# Patient Record
Sex: FEMALE | Race: BLACK OR AFRICAN AMERICAN | Employment: OTHER | URBAN - METROPOLITAN AREA
[De-identification: names, ages, dates, MRNs, and addresses within clinical notes are randomized per-mention and may not be internally consistent; named-entity substitution may affect disease eponyms.]

---

## 2017-02-20 ENCOUNTER — OFFICE VISIT (OUTPATIENT)
Dept: INTERNAL MEDICINE CLINIC | Age: 67
End: 2017-02-20

## 2017-02-20 ENCOUNTER — HOSPITAL ENCOUNTER (OUTPATIENT)
Dept: LAB | Age: 67
Discharge: HOME | End: 2017-02-20
Payer: MEDICARE

## 2017-02-20 VITALS
TEMPERATURE: 99.2 F | DIASTOLIC BLOOD PRESSURE: 86 MMHG | BODY MASS INDEX: 29.09 KG/M2 | WEIGHT: 181 LBS | RESPIRATION RATE: 16 BRPM | HEIGHT: 66 IN | SYSTOLIC BLOOD PRESSURE: 132 MMHG | OXYGEN SATURATION: 98 % | HEART RATE: 62 BPM

## 2017-02-20 DIAGNOSIS — E78.2 MIXED HYPERLIPIDEMIA: ICD-10-CM

## 2017-02-20 DIAGNOSIS — Z13.39 SCREENING FOR ALCOHOLISM: ICD-10-CM

## 2017-02-20 DIAGNOSIS — I10 BENIGN HYPERTENSION: ICD-10-CM

## 2017-02-20 DIAGNOSIS — Z00.00 ROUTINE GENERAL MEDICAL EXAMINATION AT A HEALTH CARE FACILITY: ICD-10-CM

## 2017-02-20 DIAGNOSIS — Z23 ENCOUNTER FOR IMMUNIZATION: ICD-10-CM

## 2017-02-20 DIAGNOSIS — Z00.00 ENCOUNTER FOR MEDICARE ANNUAL WELLNESS EXAM: Primary | ICD-10-CM

## 2017-02-20 DIAGNOSIS — Z79.899 ENCOUNTER FOR LONG-TERM (CURRENT) DRUG USE: ICD-10-CM

## 2017-02-20 DIAGNOSIS — E11.9 CONTROLLED TYPE 2 DIABETES MELLITUS WITHOUT COMPLICATION, WITHOUT LONG-TERM CURRENT USE OF INSULIN (HCC): ICD-10-CM

## 2017-02-20 PROBLEM — Z78.9 ADVANCE DIRECTIVE IN CHART: Status: ACTIVE | Noted: 2017-02-20

## 2017-02-20 PROBLEM — Z78.9 ADVANCE DIRECTIVE IN CHART: Chronic | Status: ACTIVE | Noted: 2017-02-20

## 2017-02-20 PROCEDURE — 80053 COMPREHEN METABOLIC PANEL: CPT

## 2017-02-20 PROCEDURE — 80061 LIPID PANEL: CPT

## 2017-02-20 PROCEDURE — 82043 UR ALBUMIN QUANTITATIVE: CPT

## 2017-02-20 PROCEDURE — 36415 COLL VENOUS BLD VENIPUNCTURE: CPT

## 2017-02-20 PROCEDURE — 83036 HEMOGLOBIN GLYCOSYLATED A1C: CPT

## 2017-02-20 NOTE — MR AVS SNAPSHOT
Visit Information Date & Time Provider Department Dept. Phone Encounter #  
 2/20/2017 10:40 AM MD [facility] [phone] [id number] Follow-up Instructions Return in about 4 months (around 6/20/2017) for diabetes type 2, hypertension, hyperlipidemia. Your Appointments 2/20/2017 10:40 AM  
ROUTINE CARE with MD [facility] ([facility]) Appt Note: 4m fu for hypertension, diabetes type [address], [address] [place] [address]  
[address], [place] [address] [phone] Upcoming Health Maintenance Date Due  
 MEDICARE YEARLY EXAM 9/28/2016 HEMOGLOBIN A1C Q6M 4/19/2017 EYE EXAM RETINAL OR DILATED Q1 4/29/2017 MICROALBUMIN Q1 10/19/2017 LIPID PANEL Q1 10/19/2017 FOOT EXAM Q1 2/20/2018 GLAUCOMA SCREENING Q2Y 4/29/2018 BREAST CANCER SCRN MAMMOGRAM 9/6/2018 COLONOSCOPY 2/18/2023 DTaP/Tdap/Td series (2 - Td) 10/1/2025 Allergies as of 2/20/2017  Review Complete On: 2/20/2017 By: Aixa Argueta MD  
  
 Severity Noted Reaction Type Reactions Cocoa  09/28/2015    Swelling Swelling of the tongue Peanut  05/13/2015    Swelling Tongue swelling Current Immunizations  Reviewed on 2/20/2017 Name Date Influenza High Dose Vaccine PF 10/19/2016 Influenza Vaccine 9/16/2013 Influenza Vaccine Whole 10/1/2012 Pneumococcal Conjugate (PCV-13) 6/27/2016 Pneumococcal Polysaccharide (PPSV-23)  Incomplete Tdap 10/1/2015 Reviewed by Aixa Argueta MD on 2/20/2017 at 10:31 AM  
 Reviewed by Aixa Argueta MD on 2/20/2017 at 10:31 AM  
You Were Diagnosed With   
  
 Codes Comments Encounter for Medicare annual wellness exam    -  Primary ICD-10-CM: Z00.00 ICD-9-CM: V70.0 Controlled type 2 diabetes mellitus without complication, without long-term current use of insulin ([facility]     ICD-10-CM: E11.9 ICD-9-CM: 250.00 Benign hypertension     ICD-10-CM: I10 
ICD-9-CM: 401.1 Mixed hyperlipidemia     ICD-10-CM: E78.2 ICD-9-CM: 272.2 Encounter for long-term (current) drug use     ICD-10-CM: Z79.899 ICD-9-CM: V58.69 Routine general medical examination at a health care facility     ICD-10-CM: Z00.00 ICD-9-CM: V70.0 Screening for alcoholism     ICD-10-CM: Z13.89 ICD-9-CM: V79.1 Encounter for immunization     ICD-10-CM: N47 ICD-9-CM: V03.89 Vitals BP Pulse Temp Resp Height(growth percentile) Weight(growth percentile) 132/86 (BP 1 Location: Left arm, BP Patient Position: Sitting) 62 99.2 °F (37.3 °C) (Oral) 16 5' 5.5\" (1.664 m) 181 lb (82.1 kg) SpO2 BMI OB Status Smoking Status 98% 29.66 kg/m2 Postmenopausal Never Smoker Vitals History BMI and BSA Data Body Mass Index Body Surface Area  
 29.66 kg/m 2 1.95 m 2 Preferred Pharmacy Pharmacy Name Phone [id number] Melania Howard, [address] [place] [address] [phone] Your Updated Medication List  
  
   
This list is accurate as of: 2/20/17 10:36 AM.  Always use your most recent med list.  
  
  
  
  
 FISH OIL 1,000 mg Cap Generic drug:  omega-3 fatty acids-vitamin e Take 4 Caps by mouth daily. losartan-hydroCHLOROthiazide 100-12.5 mg per tablet Commonly known as:  HYZAAR Take 1 Tab by mouth daily. VITAMIN B-12 PO Take  by mouth. VITAMIN D3 PO Take 2,000 Units by mouth daily. We Performed the Following ADMIN PNEUMOCOCCAL VACCINE [ HCPCS] HEMOGLOBIN A1C WITH EAG [71097 CPT(R)] LIPID PANEL [86828 CPT(R)] METABOLIC PANEL, COMPREHENSIVE [21231 CPT(R)] MICROALBUMIN, UR, RAND W/ MICROALBUMIN/CREA RATIO [id number] CPT(R)] PNEUMOCOCCAL POLYSACCHARIDE VACCINE, 23-VALENT, ADULT OR IMMUNOSUPPRESSED PT DOSE, [34185 CPT(R)] Follow-up Instructions Return in about 4 months (around 6/20/2017) for diabetes type 2, hypertension, hyperlipidemia. To-Do List   
 09/07/2017 1:45 PM  
  Appointment with [facility] VALERY 1 at [address] [place] [address] ([phone]) Shower or bathe using soap and water. Do not use deodorant, powder, perfumes, or lotion the day of your exam.  If your prior mammograms were not performed at [facility] 6 please bring films with you or forward prior images 2 days before your procedure. Check in at registration 15min before your appointment time unless you were instructed to do otherwise. A script is not necessary, but if you have one, please bring it on the day of the mammogram or have it faxed to the department. [facility] [phone] [facility]  [phone] [address]  [phone] [facility] [phone] [facility] 7437 [facility] [phone] Patient Instructions Vaccine Information Statement Pneumococcal Polysaccharide Vaccine: What You Need to Know Many Vaccine Information Statements are available in Ukrainian and other languages. See www.immunize.org/vis. Hojas de información Sobre Vacunas están disponibles en español y en muchos otros idiomas. Visite WorthScale.si. 1. Why get vaccinated? Vaccination can protect older adults (and some children and younger adults) from pneumococcal disease. Pneumococcal disease is caused by bacteria that can spread from person to person through close contact. It can cause ear infections, and it can also lead to more serious infections of the: 
 Lungs (pneumonia),  Blood (bacteremia), and 
 Covering of the brain and spinal cord (meningitis). Meningitis can cause deafness and brain damage, and it can be fatal.   
 
Anyone can get pneumococcal disease, but children under 3years of age, people with certain medical conditions, adults over 72years of age, and cigarette smokers are at the highest risk. About 18,000 older adults die each year from pneumococcal disease in the [place]. Treatment of pneumococcal infections with penicillin and other drugs used to be more effective. But some strains of the disease have become resistant to these drugs. This makes prevention of the disease, through vaccination, even more important. 2. Pneumococcal polysaccharide vaccine (PPSV23) Pneumococcal polysaccharide vaccine (PPSV23) protects against 23 types of pneumococcal bacteria. It will not prevent all pneumococcal disease. PPSV23 is recommended for:  All adults 72years of age and older,  Anyone 2 through 59years of age with certain long-term health problems, 
 Anyone 2 through 59years of age with a weakened immune system, 
 Adults 23 through 59years of age who smoke cigarettes or have asthma. Most people need only one dose of PPSV. A second dose is recommended for certain high-risk groups. People 72 and older should get a dose even if they have gotten one or more doses of the vaccine before they turned 65. Your healthcare provider can give you more information about these recommendations. Most healthy adults develop protection within 2 to 3 weeks of getting the shot. 3. Some people should not get this vaccine  Anyone who has had a life-threatening allergic reaction to PPSV should not get another dose.  Anyone who has a severe allergy to any component of PPSV should not receive it. Tell your provider if you have any severe allergies.  Anyone who is moderately or severely ill when the shot is scheduled may be asked to wait until they recover before getting the vaccine. Someone with a mild illness can usually be vaccinated.  Children less than 3years of age should not receive this vaccine.  There is no evidence that PPSV is harmful to either a pregnant woman or to her fetus.  However, as a precaution, women who need the vaccine should be vaccinated before becoming pregnant, if possible. 4. Risks of a vaccine reaction With any medicine, including vaccines, there is a chance of side effects. These are usually mild and go away on their own, but serious reactions are also possible. About half of people who get PPSV have mild side effects, such as redness or pain where the shot is given, which go away within about two days. Less than 1 out of 100 people develop a fever, muscle aches, or more severe local reactions. Problems that could happen after any vaccine:  People sometimes faint after a medical procedure, including vaccination. Sitting or lying down for about 15 minutes can help prevent fainting, and injuries caused by a fall. Tell your doctor if you feel dizzy, or have vision changes or ringing in the ears.  Some people get severe pain in the shoulder and have difficulty moving the arm where a shot was given. This happens very rarely.  Any medication can cause a severe allergic reaction. Such reactions from a vaccine are very rare, estimated at about 1 in a million doses, and would happen within a few minutes to a few hours after the vaccination. As with any medicine, there is a very remote chance of a vaccine causing a serious injury or death. The safety of vaccines is always being monitored. For more information, visit: www.cdc.gov/vaccinesafety/  
 
5. What if there is a serious reaction? What should I look for? Look for anything that concerns you, such as signs of a severe allergic reaction, very high fever, or unusual behavior. Signs of a severe allergic reaction can include hives, swelling of the face and throat, difficulty breathing, a fast heartbeat, dizziness, and weakness. These would usually start a few minutes to a few hours after the vaccination. What should I do?  
 
If you think it is a severe allergic reaction or other emergency that cant wait, call 9-1-1 or get to the nearest hospital. Otherwise, call your doctor. Afterward, the reaction should be reported to the Vaccine Adverse Event Reporting System (VAERS). Your doctor might file this report, or you can do it yourself through the VAERS web site at www.vaers. [facility].gov, or by calling 5-498.549.9310. VAERS does not give medical advice. 6. How can I learn more?  Ask your doctor. He or she can give you the vaccine package insert or suggest other sources of information.  Call your local or state health department.  Contact the Centers for Disease Control and Prevention (CDC): 
- Call 7-872.780.3799 (1-800-CDC-INFO) or 
- Visit CDCs website at www.cdc.gov/vaccines Vaccine Information Statement PPSV  
04/24/2015 Department of [facility] and [employer] Centers for Disease Control and Prevention Office Use Only Introducing [facility] SERVICES! Megan Sherman introduces [employer] patient portal. Now you can access parts of your medical record, email your doctor's office, and request medication refills online. 1. In your internet browser, go to [email or web address] 2. Click on the First Time User? Click Here link in the Sign In box. You will see the New Member Sign Up page. 3. Enter your [employer] Access Code exactly as it appears below. You will not need to use this code after youve completed the sign-up process. If you do not sign up before the expiration date, you must request a new code. · [employer] Access Code: [email or web address] Expires: 5/21/2017 10:26 AM 
 
4. Enter the last four digits of your Social Security Number (xxxx) and Date of Birth (mm/dd/yyyy) as indicated and click Submit. You will be taken to the next sign-up page. 5. Create a Scary Mommyt ID. This will be your [employer] login ID and cannot be changed, so think of one that is secure and easy to remember. 6. Create a Scary Mommyt password. You can change your password at any time. 7. Enter your Password Reset Question and Answer. This can be used at a later time if you forget your password. 8. Enter your e-mail address. You will receive e-mail notification when new information is available in [address] 9. Click Sign Up. You can now view and download portions of your medical record. 10. Click the Download Summary menu link to download a portable copy of your medical information. If you have questions, please visit the Frequently Asked Questions section of the [employer] website. Remember, [employer] is NOT to be used for urgent needs. For medical emergencies, dial 911. Now available from your iPhone and Android! Please provide this summary of care documentation to your next provider. Your primary care clinician is listed as Danica Montalvo. If you have any questions after today's visit, please call [phone].

## 2017-02-20 NOTE — PROGRESS NOTES
Subjective:      Precious Peralta is a 78 y.o. female who presents today for follow up of her hypertension, diabetes mellitus type 2 - diet controlled and hyperlipidemia. Not checking her glucose at home. She is maintaining a diabetes mellitus diet. Exercise - light weights daily. 15 minutes. She denies polyuria, polydipsia, nocturia, nausea/vomiting, bowel changes, chest pain, syncope, dyspnea or lightheadedness. Patient Active Problem List   Diagnosis Code    Essential hypertension I10    History of screening mammography Z92.89    Pap smear for cervical cancer screening Z12.4    Screen for colon cancer Z12.11    [facility], [facility] ([facility] E11.9    History of bone density study Z92.89    Hyperlipidemia E78.5    Diabetic eye exam ([facility].) E11.9, Z01.00     Current Outpatient Prescriptions   Medication Sig Dispense Refill    losartan-hydroCHLOROthiazide (HYZAAR) 100-12.5 mg per tablet Take 1 Tab by mouth daily. 90 Tab 0    omega-3 fatty acids-vitamin e (FISH OIL) 1,000 mg cap Take 4 Caps by mouth daily.  CHOLECALCIFEROL, VITAMIN D3, (VITAMIN D3 PO) Take 2,000 Units by mouth daily.  CYANOCOBALAMIN, VITAMIN B-12, (VITAMIN B-12 PO) Take  by mouth. Review of Systems    Pertinent items are noted in HPI. Objective:      Wt Readings from Last 3 Encounters:   02/20/17 181 lb (82.1 kg)   10/19/16 180 lb (81.6 kg)   06/15/16 176 lb (79.8 kg)     Temp Readings from Last 3 Encounters:   02/20/17 99.2 °F (37.3 °C) (Oral)   10/19/16 97.2 °F (36.2 °C) (Oral)   06/15/16 97.4 °F (36.3 °C) (Oral)     BP Readings from Last 3 Encounters:   02/20/17 132/86   10/19/16 140/70   06/15/16 140/82     Pulse Readings from Last 3 Encounters:   02/20/17 62   10/19/16 60   06/15/16 60        Visit Vitals    /86 (BP 1 Location: Left arm, BP Patient Position: Sitting)    Pulse 62    Temp 99.2 °F (37.3 °C) (Oral)    Resp 16    Ht 5' 5.5\" (1.664 m)    Wt 181 lb (82.1 kg)    SpO2 98%    BMI 29.66 kg/m2     General appearance: alert, cooperative, no distress, appears stated age  Head: Normocephalic, without obvious abnormality, atraumatic  Neck: supple, symmetrical, trachea midline, no adenopathy, no carotid bruit and no JVD  Lungs: clear to auscultation bilaterally  Abdomen: soft, non-tender. Bowel sounds normal. No masses,  no organomegaly    Diabetic foot exam:     Left: Reflexes 1+     Vibratory sensation normal    Proprioception normal   Sharp/dull discrimination normal    Filament test normal sensation with micro filament   Pulse DP: 2+ (normal)   Pulse PT: 2+ (normal)   Deformities: some callus formation  Right: Reflexes 1+   Vibratory sensation normal   Proprioception normal   Sharp/dull discrimination normal   Filament test normal sensation with micro filament   Pulse DP: 2+ (normal)   Pulse PT: 2+ (normal)   Deformities: Mild - callus formations     Assessment/Plan:     1. Controlled type 2 diabetes mellitus without complication, without long-term current use of insulin ([address]- diet controlled. -continue diabetes mellitus diet.  -encouraged increase in cardio exercise. - METABOLIC PANEL, COMPREHENSIVE  - HEMOGLOBIN A1C WITH EAG  - MICROALBUMIN, UR, RAND W/ MICROALBUMIN/CREA RATIO    2. Benign hypertension  -I evaluated and recommended to continue current doses of medications. 3. Mixed hyperlipidemia  -diet controlled at this time.     - LIPID PANEL    4. Encounter for long-term (current) drug use      5. Encounter for immunization  -patient received her pneumovax today without any complications.     - PNEUMOCOCCAL POLYSACCHARIDE VACCINE, 23-VALENT, ADULT OR IMMUNOSUPPRESSED PT DOSE,  - ADMIN PNEUMOCOCCAL VACCINE  (MEDICARE ONLY)       Follow-up Disposition:     Follow up 4 months      Return if symptoms worsen or fail to improve.    Advised patient to call back or return to office if symptoms worsen/change/persist.     Discussed expected course/resolution/complications of diagnosis in detail with patient. Medication risks/benefits/costs/interactions/alternatives discussed with patient. Patient was given an after visit summary which includes diagnoses, current medications, & vitals. Patient expressed understanding with the diagnosis and plan.

## 2017-02-20 NOTE — PATIENT INSTRUCTIONS
Vaccine Information Statement    Pneumococcal Polysaccharide Vaccine: What You Need to Know    Many Vaccine Information Statements are available in Faroese and other languages. See www.immunize.org/vis. Hojas de información Sobre Vacunas están disponibles en español y en muchos otros idiomas. Visite Kailey.si. 1. Why get vaccinated? Vaccination can protect older adults (and some children and younger adults) from pneumococcal disease. Pneumococcal disease is caused by bacteria that can spread from person to person through close contact. It can cause ear infections, and it can also lead to more serious infections of the:   Lungs (pneumonia),   Blood (bacteremia), and   Covering of the brain and spinal cord (meningitis). Meningitis can cause deafness and brain damage, and it can be fatal.      Anyone can get pneumococcal disease, but children under 3years of age, people with certain medical conditions, adults over 72years of age, and cigarette smokers are at the highest risk. About 18,000 older adults die each year from pneumococcal disease in the [place]. Treatment of pneumococcal infections with penicillin and other drugs used to be more effective. But some strains of the disease have become resistant to these drugs. This makes prevention of the disease, through vaccination, even more important. 2. Pneumococcal polysaccharide vaccine (PPSV23)    Pneumococcal polysaccharide vaccine (PPSV23) protects against 23 types of pneumococcal bacteria. It will not prevent all pneumococcal disease. PPSV23 is recommended for:   All adults 72years of age and older,   Anyone 2 through 59years of age with certain long-term health problems,   Anyone 2 through 59years of age with a weakened immune system,   Adults 23 through 59years of age who smoke cigarettes or have asthma. Most people need only one dose of PPSV.   A second dose is recommended for certain high-risk groups. People 72 and older should get a dose even if they have gotten one or more doses of the vaccine before they turned 65. Your healthcare provider can give you more information about these recommendations. Most healthy adults develop protection within 2 to 3 weeks of getting the shot. 3. Some people should not get this vaccine     Anyone who has had a life-threatening allergic reaction to PPSV should not get another dose.  Anyone who has a severe allergy to any component of PPSV should not receive it. Tell your provider if you have any severe allergies.  Anyone who is moderately or severely ill when the shot is scheduled may be asked to wait until they recover before getting the vaccine. Someone with a mild illness can usually be vaccinated.  Children less than 3years of age should not receive this vaccine.  There is no evidence that PPSV is harmful to either a pregnant woman or to her fetus. However, as a precaution, women who need the vaccine should be vaccinated before becoming pregnant, if possible. 4. Risks of a vaccine reaction    With any medicine, including vaccines, there is a chance of side effects. These are usually mild and go away on their own, but serious reactions are also possible. About half of people who get PPSV have mild side effects, such as redness or pain where the shot is given, which go away within about two days. Less than 1 out of 100 people develop a fever, muscle aches, or more severe local reactions. Problems that could happen after any vaccine:     People sometimes faint after a medical procedure, including vaccination. Sitting or lying down for about 15 minutes can help prevent fainting, and injuries caused by a fall. Tell your doctor if you feel dizzy, or have vision changes or ringing in the ears.  Some people get severe pain in the shoulder and have difficulty moving the arm where a shot was given.  This happens very rarely.  Any medication can cause a severe allergic reaction. Such reactions from a vaccine are very rare, estimated at about 1 in a million doses, and would happen within a few minutes to a few hours after the vaccination. As with any medicine, there is a very remote chance of a vaccine causing a serious injury or death. The safety of vaccines is always being monitored. For more information, visit: www.cdc.gov/vaccinesafety/     5. What if there is a serious reaction? What should I look for? Look for anything that concerns you, such as signs of a severe allergic reaction, very high fever, or unusual behavior. Signs of a severe allergic reaction can include hives, swelling of the face and throat, difficulty breathing, a fast heartbeat, dizziness, and weakness. These would usually start a few minutes to a few hours after the vaccination. What should I do? If you think it is a severe allergic reaction or other emergency that cant wait, call 9-1-1 or get to the nearest hospital. Otherwise, call your doctor. Afterward, the reaction should be reported to the Vaccine Adverse Event Reporting System (VAERS). Your doctor might file this report, or you can do it yourself through the VAERS web site at www.vaers. [facility].gov, or by calling 7-125.174.9032. [facility] does not give medical advice. 6. How can I learn more?  Ask your doctor. He or she can give you the vaccine package insert or suggest other sources of information.  Call your local or state health department.    Contact the Centers for Disease Control and Prevention (CDC):  - Call 9-469.361.1607 (1-800-CDC-INFO) or  - Visit CDCs website at [place] [address] 04/24/2015    [facility] and [facility] for Disease Control and Prevention    Office Use Only

## 2017-02-20 NOTE — PROGRESS NOTES
HPI    ROS    Physical Exam    This is a Subsequent Medicare Annual Wellness Visit providing Personalized Prevention Plan Services (PPPS) (Performed 12 months after initial AWV and PPPS )    I have reviewed the patient's medical history in detail and updated the computerized patient record. History     Ashley Mayes is a 78 y.o. female who presents today for her medicare wellness exam.     Screening mammogram was completed 9/6/16  Gyn exam with Dr. Cosmo Coto was done 3/19/15 and has follow up 3/1/17. DXA done at [facility] 1-/2015. Normal. Need follow up end of this year  Screening colonoscopy done 1/21/13. Asked to follow up in  5 years. Eye Exam -with Dr. Debbie Crump 4/29/16  Advanced Directive - in chart  Immunizations - need pneumovax today    Past Medical History   Diagnosis Date    Pap smear for cervical cancer screening 3/22/2012      History reviewed. No pertinent past surgical history. Current Outpatient Prescriptions   Medication Sig Dispense Refill    losartan-hydroCHLOROthiazide (HYZAAR) 100-12.5 mg per tablet Take 1 Tab by mouth daily. 90 Tab 0    omega-3 fatty acids-vitamin e (FISH OIL) 1,000 mg cap Take 4 Caps by mouth daily.  CHOLECALCIFEROL, VITAMIN D3, (VITAMIN D3 PO) Take 2,000 Units by mouth daily.  CYANOCOBALAMIN, VITAMIN B-12, (VITAMIN B-12 PO) Take  by mouth.        Allergies   Allergen Reactions    Cocoa Swelling     Swelling of the tongue    Peanut Swelling     Tongue swelling     Family History   Problem Relation Age of Onset    Breast Cancer Mother      29's     Social History   Substance Use Topics    Smoking status: Never Smoker    Smokeless tobacco: Never Used    Alcohol use No     Patient Active Problem List   Diagnosis Code    Essential hypertension I10    History of screening mammography Z92.89    Pap smear for cervical cancer screening Z12.4    Screen for colon cancer Z12.11    Diabetes mellitus, type 2 ([address] E11.9    History of bone density study Z92.89  Hyperlipidemia E78.5    Diabetic eye exam ([facility] E11.9, Z01.00       Depression Risk Factor Screening:      PHQ 2 / 9, over the last two weeks 10/19/2016   Little interest or pleasure in doing things Not at all   Feeling down, depressed or hopeless Not at all   Total Score PHQ 2 0     Alcohol Risk Factor Screening: On any occasion during the past 3 months, have you had more than 3 drinks containing alcohol? No    Do you average more than 7 drinks per week? No      Functional Ability and Level of Safety:     Hearing Loss   borderline normal-to-mild    Activities of Daily Living   Self-care. Requires assistance with: no ADLs    Fall Risk     Fall Risk Assessment, last 12 mths 10/19/2016   Able to walk? Yes   Fall in past 12 months? No     Abuse Screen   Patient is not abused    Review of Systems   A comprehensive review of systems was negative except for that written in the HPI. Physical Examination     Evaluation of Cognitive Function:  Mood/affect:  happy  Appearance: age appropriate  Family member/caregiver input: n/a    See below for exam notes    Patient Care Team:  Alistair Hu MD as PCP - General (Internal Medicine)    Advice/Referrals/Counseling   Education and counseling provided:  Are appropriate based on today's review and evaluation    Assessment/Plan    Health Care Maintenance   -up to date with all preventive care as noted above. Mario Piedra

## 2017-02-21 LAB
ALBUMIN SERPL-MCNC: 4.4 G/DL (ref 3.6–4.8)
ALBUMIN/CREAT UR: 15.6 MG/G CREAT (ref 0–30)
ALBUMIN/GLOB SERPL: 1.4 {RATIO} (ref 1.1–2.5)
ALP SERPL-CCNC: 123 IU/L (ref 39–117)
ALT SERPL-CCNC: 32 IU/L (ref 0–32)
AST SERPL-CCNC: 27 IU/L (ref 0–40)
BILIRUB SERPL-MCNC: 0.6 MG/DL (ref 0–1.2)
BUN SERPL-MCNC: 12 MG/DL (ref 8–27)
BUN/CREAT SERPL: 18 (ref 11–26)
CALCIUM SERPL-MCNC: 10.4 MG/DL (ref 8.7–10.3)
CHLORIDE SERPL-SCNC: 103 MMOL/L (ref 96–106)
CHOLEST SERPL-MCNC: 214 MG/DL (ref 100–199)
CO2 SERPL-SCNC: 22 MMOL/L (ref 18–29)
CREAT SERPL-MCNC: 0.67 MG/DL (ref 0.57–1)
CREAT UR-MCNC: 50.6 MG/DL
EST. AVERAGE GLUCOSE BLD GHB EST-MCNC: 143 MG/DL
GLOBULIN SER CALC-MCNC: 3.1 G/DL (ref 1.5–4.5)
GLUCOSE SERPL-MCNC: 123 MG/DL (ref 65–99)
HBA1C MFR BLD: 6.6 % (ref 4.8–5.6)
HDLC SERPL-MCNC: 41 MG/DL
INTERPRETATION, 910389: NORMAL
LDLC SERPL CALC-MCNC: 151 MG/DL (ref 0–99)
Lab: NORMAL
MICROALBUMIN UR-MCNC: 7.9 UG/ML
POTASSIUM SERPL-SCNC: 3.8 MMOL/L (ref 3.5–5.2)
PROT SERPL-MCNC: 7.5 G/DL (ref 6–8.5)
SODIUM SERPL-SCNC: 144 MMOL/L (ref 134–144)
TRIGL SERPL-MCNC: 110 MG/DL (ref 0–149)
VLDLC SERPL CALC-MCNC: 22 MG/DL (ref 5–40)

## 2017-02-21 NOTE — TELEPHONE ENCOUNTER
Pt is returning missed call from Dr. Karyle Contras. Pt also states that at recent ov Rx losartan-hydroCHLOROthiazide (HYZAAR) 100-12.5 mg was not refilled.  [facility] on [facility]

## 2017-02-23 RX ORDER — ATORVASTATIN CALCIUM 10 MG/1
10 TABLET, FILM COATED ORAL DAILY
Qty: 90 TAB | Refills: 1 | Status: SHIPPED | OUTPATIENT
Start: 2017-02-23 | End: 2017-07-27 | Stop reason: SDUPTHER

## 2017-02-23 RX ORDER — LOSARTAN POTASSIUM AND HYDROCHLOROTHIAZIDE 12.5; 1 MG/1; MG/1
1 TABLET ORAL DAILY
Qty: 90 TAB | Refills: 1 | Status: SHIPPED | OUTPATIENT
Start: 2017-02-23 | End: 2017-07-31 | Stop reason: SDUPTHER

## 2017-02-23 NOTE — TELEPHONE ENCOUNTER
Spoke with patient regarding her elevated cholesterol. Recommended starting lipitor 10mg daily. She is exercising and watching her diet. Also refilled her hyzaar    Orders Placed This Encounter    losartan-hydroCHLOROthiazide (HYZAAR) 100-12.5 mg per tablet     Sig: Take 1 Tab by mouth daily. Dispense:  90 Tab     Refill:  1    atorvastatin (LIPITOR) 10 mg tablet     Sig: Take 1 Tab by mouth daily.      Dispense:  90 Tab     Refill:  1

## 2017-05-18 ENCOUNTER — TELEPHONE (OUTPATIENT)
Dept: INTERNAL MEDICINE CLINIC | Age: 67
End: 2017-05-18

## 2017-05-18 NOTE — TELEPHONE ENCOUNTER
Patient states she has developed a jaw problem from grinding her teeth at night, she started taking lipitor about 2-3 months ago, and states she did not notice the jaw pain before she started taking the lipitor, but does not think the lipitor is the cause her her jaw pain, although she states that when she takes the lipitor, she notices that it intensifies her jaw pain. She would like to know if she can cut the lipitor in half, until her jaw is able to heal or becomes less tender, please advise.

## 2017-05-19 NOTE — TELEPHONE ENCOUNTER
Please let her know that lipitor is not the cause of her pain, it is TMJ.  She needs to go see her dentist to get mouth guard fitted

## 2017-05-19 NOTE — TELEPHONE ENCOUNTER
Call to pt - she states that she went to her dentist and he advised the same information as Dr. Mayela Meredith and provided her information on TMJ. He also advised her to stop her Lipitor until Monday.

## 2017-06-23 ENCOUNTER — OFFICE VISIT (OUTPATIENT)
Dept: INTERNAL MEDICINE CLINIC | Age: 67
End: 2017-06-23

## 2017-06-23 VITALS
TEMPERATURE: 96.5 F | WEIGHT: 182 LBS | SYSTOLIC BLOOD PRESSURE: 136 MMHG | HEART RATE: 55 BPM | BODY MASS INDEX: 29.25 KG/M2 | HEIGHT: 66 IN | OXYGEN SATURATION: 97 % | DIASTOLIC BLOOD PRESSURE: 84 MMHG | RESPIRATION RATE: 14 BRPM

## 2017-06-23 DIAGNOSIS — E78.2 MIXED HYPERLIPIDEMIA: ICD-10-CM

## 2017-06-23 DIAGNOSIS — Z79.899 ENCOUNTER FOR LONG-TERM (CURRENT) USE OF MEDICATIONS: ICD-10-CM

## 2017-06-23 DIAGNOSIS — E11.9 CONTROLLED TYPE 2 DIABETES MELLITUS WITHOUT COMPLICATION, WITHOUT LONG-TERM CURRENT USE OF INSULIN (HCC): Primary | ICD-10-CM

## 2017-06-23 DIAGNOSIS — I10 BENIGN HYPERTENSION: ICD-10-CM

## 2017-06-23 NOTE — MR AVS SNAPSHOT
Visit Information Date & Time Provider Department Dept. Phone Encounter #  
 6/23/2017 10:40 AM Ben Diaz MD [facility] [phone] Follow-up Instructions Return in about 4 months (around 10/23/2017). Upcoming Health Maintenance Date Due  
 EYE EXAM RETINAL OR DILATED Q1 4/29/2017 INFLUENZA AGE 9 TO ADULT 8/1/2017 HEMOGLOBIN A1C Q6M 8/20/2017 FOOT EXAM Q1 2/20/2018 MICROALBUMIN Q1 2/20/2018 LIPID PANEL Q1 2/20/2018 MEDICARE YEARLY EXAM 2/21/2018 GLAUCOMA SCREENING Q2Y 4/29/2018 BREAST CANCER SCRN MAMMOGRAM 9/6/2018 COLONOSCOPY 2/18/2023 DTaP/Tdap/Td series (2 - Td) 10/1/2025 Allergies as of 6/23/2017  Review Complete On: 6/23/2017 By: Ben Diaz MD  
  
 Severity Noted Reaction Type Reactions Cocoa  09/28/2015    Swelling Swelling of the tongue Peanut  05/13/2015    Swelling Tongue swelling Current Immunizations  Reviewed on 2/20/2017 Name Date Influenza High Dose Vaccine PF 10/19/2016 Influenza Vaccine 9/16/2013 Influenza Vaccine Whole 10/1/2012 Pneumococcal Conjugate (PCV-13) 6/27/2016 Pneumococcal Polysaccharide (PPSV-23) 2/20/2017 Tdap 10/1/2015 Not reviewed this visit You Were Diagnosed With   
  
 Codes Comments Controlled type 2 diabetes mellitus without complication, without long-term current use of insulin [facility]    -  Primary ICD-10-CM: E11.9 ICD-9-CM: 250.00 Benign hypertension     ICD-10-CM: I10 
ICD-9-CM: 401.1 Mixed hyperlipidemia     ICD-10-CM: E78.2 ICD-9-CM: 272.2 Encounter for long-term (current) use of medications     ICD-10-CM: Z79.899 ICD-9-CM: V58.69 Vitals BP Pulse Temp Resp Height(growth percentile) Weight(growth percentile) 136/84 (BP 1 Location: Left arm, BP Patient Position: Sitting) (!) 55 96.5 °F (35.8 °C) (Oral) 14 5' 5.5\" (1.664 m) 182 lb (82.6 kg) SpO2 BMI OB Status Smoking Status 97% 29.83 kg/m2 Postmenopausal Never Smoker Vitals History BMI and BSA Data Body Mass Index Body Surface Area  
 29.83 kg/m 2 1.95 m 2 Preferred Pharmacy Pharmacy Name Phone [id number] [address], [address] [facility] [phone] Your Updated Medication List  
  
   
This list is accurate as of: 6/23/17 11:34 AM.  Always use your most recent med list.  
  
  
  
  
 atorvastatin 10 mg tablet Commonly known as:  LIPITOR Take 1 Tab by mouth daily. FISH OIL 1,000 mg Cap Generic drug:  omega-3 fatty acids-vitamin e Take 4 Caps by mouth daily. losartan-hydroCHLOROthiazide 100-12.5 mg per tablet Commonly known as:  HYZAAR Take 1 Tab by mouth daily. VITAMIN B-12 PO Take  by mouth. VITAMIN D3 PO Take 2,000 Units by mouth daily. We Performed the Following AMB POC FUNDUS PHOTOGRAPHY WITH INTERP AND REPORT [07303 CPT(R)] HEMOGLOBIN A1C WITH EAG [63451 CPT(R)] LIPID PANEL [77547 CPT(R)] METABOLIC PANEL, COMPREHENSIVE [51560 CPT(R)] MICROALBUMIN, UR, RAND W/ MICROALBUMIN/CREA RATIO [id number] CPT(R)] Follow-up Instructions Return in about 4 months (around 10/23/2017). To-Do List   
 09/07/2017 1:45 PM  
  Appointment with [facility] 1 at [address] [place] [address] ([phone]) Shower or bathe using soap and water. Do not use deodorant, powder, perfumes, or lotion the day of your exam.  If your prior mammograms were not performed at [facility] 6 please bring films with you or forward prior images 2 days before your procedure. Check in at registration 15min before your appointment time unless you were instructed to do otherwise. A script is not necessary, but if you have one, please bring it on the day of the mammogram or have it faxed to the department.   [facility] [phone] [facility]  [phone] [facility] [phone] [facility]  [phone] [facility] [phone] [facility] [place] [phone] [facility] Introducing [facility] [place] & [facility]! Aren Granda introduces [employer] patient portal. Now you can access parts of your medical record, email your doctor's office, and request medication refills online. 1. In your internet browser, go to [email or web address] 2. Click on the First Time User? Click Here link in the Sign In box. You will see the New Member Sign Up page. 3. Enter your [employer] Access Code exactly as it appears below. You will not need to use this code after youve completed the sign-up process. If you do not sign up before the expiration date, you must request a new code. · [employer] Access Code: [email or web address] Expires: 9/21/2017 11:34 AM 
 
4. Enter the last four digits of your Social Security Number (xxxx) and Date of Birth (mm/dd/yyyy) as indicated and click Submit. You will be taken to the next sign-up page. 5. Create a [employer] ID. This will be your [employer] login ID and cannot be changed, so think of one that is secure and easy to remember. 6. Create a [employer] password. You can change your password at any time. 7. Enter your Password Reset Question and Answer. This can be used at a later time if you forget your password. 8. Enter your e-mail address. You will receive e-mail notification when new information is available in [address] 9. Click Sign Up. You can now view and download portions of your medical record. 10. Click the Download Summary menu link to download a portable copy of your medical information. If you have questions, please visit the Frequently Asked Questions section of the [employer] website. Remember, [employer] is NOT to be used for urgent needs. For medical emergencies, dial 911. Now available from your iPhone and Android! Please provide this summary of care documentation to your next provider. Your primary care clinician is listed as Danica Mane  If you have any questions after today's visit, please call [phone].

## 2017-06-23 NOTE — PROGRESS NOTES
1. Have you been to the ER, urgent care clinic since your last visit? Hospitalized since your last visit? No    2. Have you seen or consulted any other health care providers outside of the [address] [place] [address] since your last visit? Include any pap smears or colon screening. No   Saw Dentist x2 wks for sinus infection- thought it was something in her teeth. Chief Complaint   Patient presents with    Diabetes     4 month follow up    Hypertension     4 month f/u    Cholesterol Problem     4 month f/u     Not fasting.

## 2017-06-23 NOTE — PROGRESS NOTES
Subjective:      Mojgan Campos is a 75 y.o. female who presents today for follow up of her diabetes mellitus type 2, hypertension and hyperlipidemia. She was started on lipitor in February, 2017. Follows with Dr. Kandy Swanson for eye exams - has appointment in the fall. Has been on vacations and has not been good about following a diabetes mellitus diet. She denies polyuria, polydipsia, nocturia, nausea/vomiting, bowel changes, chest pain, syncope, dyspnea or lightheadedness. Patient Active Problem List   Diagnosis Code    Essential hypertension I10    History of screening mammography Z92.89    Pap smear for cervical cancer screening Z12.4    Screen for colon cancer Z12.11    [facility], [facility] E11.9    History of bone density study Z92.89    Hyperlipidemia E78.5    Diabetic eye exam ([facility] E11.9, Z01.00    Advance directive in chart Z78.9     Current Outpatient Prescriptions   Medication Sig Dispense Refill    losartan-hydroCHLOROthiazide (HYZAAR) 100-12.5 mg per tablet Take 1 Tab by mouth daily. 90 Tab 1    atorvastatin (LIPITOR) 10 mg tablet Take 1 Tab by mouth daily. 90 Tab 1    omega-3 fatty acids-vitamin e (FISH OIL) 1,000 mg cap Take 4 Caps by mouth daily.  CHOLECALCIFEROL, VITAMIN D3, (VITAMIN D3 PO) Take 2,000 Units by mouth daily.  CYANOCOBALAMIN, VITAMIN B-12, (VITAMIN B-12 PO) Take  by mouth. Review of Systems    Pertinent items are noted in HPI.      Objective:     Visit Vitals    /84 (BP 1 Location: Left arm, BP Patient Position: Sitting)    Pulse (!) 55    Temp 96.5 °F (35.8 °C) (Oral)    Resp 14    Ht 5' 5.5\" (1.664 m)    Wt 182 lb (82.6 kg)    SpO2 97%    BMI 29.83 kg/m2     General appearance: alert, cooperative, no distress, appears stated age  Head: Normocephalic, without obvious abnormality, atraumatic  Neck: supple, symmetrical, trachea midline, no adenopathy, no carotid bruit and no JVD  Lungs: clear to auscultation bilaterally  Heart: regular rate and rhythm, S1, S2 normal, no murmur, click, rub or gallop  Extremities: extremities normal, atraumatic, no cyanosis or edema  Pulses: 2+ and symmetric    Assessment/Plan:     1. Controlled type 2 diabetes mellitus without complication, without long-term current use of insulin ([facility]  -labs at this time.   -encouraged compliance with diet  -fundoscopic photo today. Still follow up with Dr. Jaime Cartwright in the fall.     - METABOLIC PANEL, COMPREHENSIVE  - HEMOGLOBIN A1C WITH EAG  - MICROALBUMIN, UR, RAND W/ MICROALBUMIN/CREA RATIO  - AMB POC FUNDUS PHOTOGRAPHY WITH INTERP AND REPORT    2. Benign hypertension  -I evaluated and recommended to continue current doses of medications. 3. Mixed hyperlipidemia  -started lipitor in 2/2017. Compliant with med.  -encouraged compliance with low fat diet.   -encouraged regular exercise    - LIPID PANEL    4. Encounter for long-term (current) use of medications    - METABOLIC PANEL, COMPREHENSIVE  - LIPID PANEL  - HEMOGLOBIN A1C WITH EAG  - MICROALBUMIN, UR, RAND W/ MICROALBUMIN/CREA RATIO     Orders Placed This Encounter    AMB POC FUNDUS PHOTOGRAPHY WITH INTERP AND REPORT    METABOLIC PANEL, COMPREHENSIVE    LIPID PANEL    HEMOGLOBIN A1C WITH EAG    MICROALBUMIN, UR, RAND W/ MICROALBUMIN/CREA RATIO      Follow-up Disposition:     Follow up 4 months      Return if symptoms worsen or fail to improve. Advised patient to call back or return to office if symptoms worsen/change/persist.     Discussed expected course/resolution/complications of diagnosis in detail with patient. Medication risks/benefits/costs/interactions/alternatives discussed with patient. Patient was given an after visit summary which includes diagnoses, current medications, & vitals. Patient expressed understanding with the diagnosis and plan.

## 2017-06-26 ENCOUNTER — HOSPITAL ENCOUNTER (OUTPATIENT)
Dept: LAB | Age: 67
Discharge: HOME | End: 2017-06-26
Payer: MEDICARE

## 2017-06-26 PROCEDURE — 36415 COLL VENOUS BLD VENIPUNCTURE: CPT

## 2017-06-26 PROCEDURE — 83036 HEMOGLOBIN GLYCOSYLATED A1C: CPT

## 2017-06-26 PROCEDURE — 80053 COMPREHEN METABOLIC PANEL: CPT

## 2017-06-26 PROCEDURE — 82043 UR ALBUMIN QUANTITATIVE: CPT

## 2017-06-26 PROCEDURE — 80061 LIPID PANEL: CPT

## 2017-06-27 ENCOUNTER — TELEPHONE (OUTPATIENT)
Dept: INTERNAL MEDICINE CLINIC | Age: 67
End: 2017-06-27

## 2017-06-27 LAB
ALBUMIN SERPL-MCNC: 4.3 G/DL (ref 3.6–4.8)
ALBUMIN/CREAT UR: 9.8 MG/G CREAT (ref 0–30)
ALBUMIN/GLOB SERPL: 1.4 {RATIO} (ref 1.2–2.2)
ALP SERPL-CCNC: 133 IU/L (ref 39–117)
ALT SERPL-CCNC: 31 IU/L (ref 0–32)
AST SERPL-CCNC: 27 IU/L (ref 0–40)
BILIRUB SERPL-MCNC: 1 MG/DL (ref 0–1.2)
BUN SERPL-MCNC: 10 MG/DL (ref 8–27)
BUN/CREAT SERPL: 12 (ref 12–28)
CALCIUM SERPL-MCNC: 10.8 MG/DL (ref 8.7–10.3)
CHLORIDE SERPL-SCNC: 104 MMOL/L (ref 96–106)
CHOLEST SERPL-MCNC: 126 MG/DL (ref 100–199)
CO2 SERPL-SCNC: 24 MMOL/L (ref 18–29)
CREAT SERPL-MCNC: 0.85 MG/DL (ref 0.57–1)
CREAT UR-MCNC: 188.6 MG/DL
EST. AVERAGE GLUCOSE BLD GHB EST-MCNC: 140 MG/DL
GLOBULIN SER CALC-MCNC: 3.1 G/DL (ref 1.5–4.5)
GLUCOSE SERPL-MCNC: 133 MG/DL (ref 65–99)
HBA1C MFR BLD: 6.5 % (ref 4.8–5.6)
HDLC SERPL-MCNC: 44 MG/DL
INTERPRETATION, 910389: NORMAL
LDLC SERPL CALC-MCNC: 68 MG/DL (ref 0–99)
Lab: NORMAL
MICROALBUMIN UR-MCNC: 18.5 UG/ML
POTASSIUM SERPL-SCNC: 3.8 MMOL/L (ref 3.5–5.2)
PROT SERPL-MCNC: 7.4 G/DL (ref 6–8.5)
SODIUM SERPL-SCNC: 144 MMOL/L (ref 134–144)
TRIGL SERPL-MCNC: 72 MG/DL (ref 0–149)
VLDLC SERPL CALC-MCNC: 14 MG/DL (ref 5–40)

## 2017-06-27 NOTE — TELEPHONE ENCOUNTER
Duyen Snare 1950     Pt would like a to speak with Dr. Tita Mesa in regards to an herbal supplement/tea that she would like to take. Pt wants to make sure it will not interfere with the medications she is taking. Pt is also taking iron supplements and would like to know if it ok if she takes a lower dosage.

## 2017-06-27 NOTE — TELEPHONE ENCOUNTER
Call to pt - she states her daughter suggested boiling a teaspoon of jose with water to make tea to consume daily. Per pt this will help with the break down of sugar, arthritis pain, and detox the body. She would like to know if this would be beneficial to try and can it cause interactions with her current medications. Pt also notes that she is currently taking 2,000 units of iron daily but states that Dr. Ruth Villegas mentioned that older \"people\" should only take 500 iu and women are taking too much iron. She would like to know if she should decrease her dosage. Recommended the pt not to alter any of her current medication until further notified by Dr. Mark Cruz was given an opportunity to ask questions, repeated information, and verbalized understanding. All questions were answered to patients satisfaction. No further questions or concerns at this time.

## 2017-06-28 NOTE — TELEPHONE ENCOUNTER
Discussed with patient that I do not recommend jose tea for detoxification. She is taking vitamin D3 2000 units daily. She is not taking any iron supplements. Recommended she continue with the vitamin D3 2000 units daily.

## 2017-07-26 ENCOUNTER — TELEPHONE (OUTPATIENT)
Dept: INTERNAL MEDICINE CLINIC | Age: 67
End: 2017-07-26

## 2017-07-26 NOTE — TELEPHONE ENCOUNTER
[phone] Patient would like a doctor referral to an audiologist for a hearing test. Please call her back at [phone].  It is ok to leave a message

## 2017-07-27 NOTE — TELEPHONE ENCOUNTER
Call to pt - she questioned if her insurance would cover her to have a hearing test completed. Advised that she should contact her insurance company regarding coverage. She was also provided with the contact information for [facility] for a recommended audiologist. Sarwat Curry was given an opportunity to ask questions, repeated information, and verbalized understanding. All questions were answered to patients satisfaction. No further questions or concerns at this time.

## 2017-10-11 ENCOUNTER — HOSPITAL ENCOUNTER (OUTPATIENT)
Dept: MAMMOGRAPHY | Age: 67
Discharge: HOME | End: 2017-10-11
Attending: INTERNAL MEDICINE
Payer: MEDICARE

## 2017-10-11 DIAGNOSIS — Z12.31 VISIT FOR SCREENING MAMMOGRAM: ICD-10-CM

## 2017-10-11 PROCEDURE — 77067 SCR MAMMO BI INCL CAD: CPT

## 2017-10-20 ENCOUNTER — OFFICE VISIT (OUTPATIENT)
Dept: INTERNAL MEDICINE CLINIC | Age: 67
End: 2017-10-20

## 2017-10-20 ENCOUNTER — HOSPITAL ENCOUNTER (OUTPATIENT)
Dept: LAB | Age: 67
Discharge: HOME | End: 2017-10-20
Payer: MEDICARE

## 2017-10-20 VITALS
DIASTOLIC BLOOD PRESSURE: 88 MMHG | OXYGEN SATURATION: 96 % | TEMPERATURE: 97.1 F | RESPIRATION RATE: 14 BRPM | BODY MASS INDEX: 28.99 KG/M2 | SYSTOLIC BLOOD PRESSURE: 128 MMHG | HEART RATE: 86 BPM | WEIGHT: 180.4 LBS | HEIGHT: 66 IN

## 2017-10-20 DIAGNOSIS — I10 BENIGN HYPERTENSION: ICD-10-CM

## 2017-10-20 DIAGNOSIS — E78.2 MIXED HYPERLIPIDEMIA: ICD-10-CM

## 2017-10-20 DIAGNOSIS — Z79.899 ENCOUNTER FOR LONG-TERM (CURRENT) USE OF MEDICATIONS: ICD-10-CM

## 2017-10-20 DIAGNOSIS — E11.9 CONTROLLED TYPE 2 DIABETES MELLITUS WITHOUT COMPLICATION, WITHOUT LONG-TERM CURRENT USE OF INSULIN (HCC): Primary | ICD-10-CM

## 2017-10-20 DIAGNOSIS — Z23 ENCOUNTER FOR IMMUNIZATION: ICD-10-CM

## 2017-10-20 PROCEDURE — 83036 HEMOGLOBIN GLYCOSYLATED A1C: CPT

## 2017-10-20 PROCEDURE — 80053 COMPREHEN METABOLIC PANEL: CPT

## 2017-10-20 PROCEDURE — 82043 UR ALBUMIN QUANTITATIVE: CPT

## 2017-10-20 NOTE — PROGRESS NOTES
Chief Complaint   Patient presents with    Hypertension     4 MONTH FOLLOW UP     1. Have you been to the ER, urgent care clinic since your last visit? Hospitalized since your last visit? No    2. Have you seen or consulted any other health care providers outside of the [address] [place] [address] since your last visit? Include any pap smears or colon screening.  [id number] [employer] 10/11/17

## 2017-10-20 NOTE — PATIENT INSTRUCTIONS
Vaccine Information Statement    Influenza (Flu) Vaccine (Inactivated or Recombinant): What you need to know    Many Vaccine Information Statements are available in [place] and other languages. See www.immunize.org/vis  Hojas de Información Sobre Vacunas están disponibles en Español y en muchos otros idiomas. Visite www.immunize.org/vis    1. Why get vaccinated? Influenza (flu) is a contagious disease that spreads around the [place] every year, usually between October and May. Flu is caused by influenza viruses, and is spread mainly by coughing, sneezing, and close contact. Anyone can get flu. Flu strikes suddenly and can last several days. Symptoms vary by age, but can include:   fever/chills   sore throat   muscle aches   fatigue   cough   headache    runny or stuffy nose    Flu can also lead to pneumonia and blood infections, and cause diarrhea and seizures in children. If you have a medical condition, such as heart or lung disease, flu can make it worse. Flu is more dangerous for some people. Infants and young children, people 72years of age and older, pregnant women, and people with certain health conditions or a weakened immune system are at greatest risk. Each year thousands of people in the [facility] die from flu, and many more are hospitalized. Flu vaccine can:   keep you from getting flu,   make flu less severe if you do get it, and   keep you from spreading flu to your family and other people. 2. Inactivated and recombinant flu vaccines    A dose of flu vaccine is recommended every flu season. Children 6 months through 6years of age may need two doses during the same flu season. Everyone else needs only one dose each flu season.        Some inactivated flu vaccines contain a very small amount of a mercury-based preservative called thimerosal. Studies have not shown thimerosal in vaccines to be harmful, but flu vaccines that do not contain thimerosal are available. There is no live flu virus in flu shots. They cannot cause the flu. There are many flu viruses, and they are always changing. Each year a new flu vaccine is made to protect against three or four viruses that are likely to cause disease in the upcoming flu season. But even when the vaccine doesnt exactly match these viruses, it may still provide some protection    Flu vaccine cannot prevent:   flu that is caused by a virus not covered by the vaccine, or   illnesses that look like flu but are not. It takes about 2 weeks for protection to develop after vaccination, and protection lasts through the flu season. 3. Some people should not get this vaccine    Tell the person who is giving you the vaccine:     If you have any severe, life-threatening allergies. If you ever had a life-threatening allergic reaction after a dose of flu vaccine, or have a severe allergy to any part of this vaccine, you may be advised not to get vaccinated. Most, but not all, types of flu vaccine contain a small amount of egg protein.  If you ever had Guillain-Barré Syndrome (also called GBS). Some people with a history of GBS should not get this vaccine. This should be discussed with your doctor.  If you are not feeling well. It is usually okay to get flu vaccine when you have a mild illness, but you might be asked to come back when you feel better. 4. Risks of a vaccine reaction    With any medicine, including vaccines, there is a chance of reactions. These are usually mild and go away on their own, but serious reactions are also possible. Most people who get a flu shot do not have any problems with it.      Minor problems following a flu shot include:    soreness, redness, or swelling where the shot was given     hoarseness   sore, red or itchy eyes   cough   fever   aches   headache   itching   fatigue  If these problems occur, they usually begin soon after the shot and last 1 or 2 days. More serious problems following a flu shot can include the following:     There may be a small increased risk of Guillain-Barré Syndrome (GBS) after inactivated flu vaccine. This risk has been estimated at 1 or 2 additional cases per million people vaccinated. This is much lower than the risk of severe complications from flu, which can be prevented by flu vaccine.  Young children who get the flu shot along with pneumococcal vaccine (PCV13) and/or DTaP vaccine at the same time might be slightly more likely to have a seizure caused by fever. Ask your doctor for more information. Tell your doctor if a child who is getting flu vaccine has ever had a seizure. Problems that could happen after any injected vaccine:      People sometimes faint after a medical procedure, including vaccination. Sitting or lying down for about 15 minutes can help prevent fainting, and injuries caused by a fall. Tell your doctor if you feel dizzy, or have vision changes or ringing in the ears.  Some people get severe pain in the shoulder and have difficulty moving the arm where a shot was given. This happens very rarely.  Any medication can cause a severe allergic reaction. Such reactions from a vaccine are very rare, estimated at about 1 in a million doses, and would happen within a few minutes to a few hours after the vaccination. As with any medicine, there is a very remote chance of a vaccine causing a serious injury or death. The safety of vaccines is always being monitored. For more information, visit: www.cdc.gov/vaccinesafety/    5. What if there is a serious reaction? What should I look for?  Look for anything that concerns you, such as signs of a severe allergic reaction, very high fever, or unusual behavior.     Signs of a severe allergic reaction can include hives, swelling of the face and throat, difficulty breathing, a fast heartbeat, dizziness, and weakness  usually within a few minutes to a few hours after the vaccination. What should I do?  If you think it is a severe allergic reaction or other emergency that cant wait, call 9-1-1 and get the person to the nearest hospital. Otherwise, call your doctor.  Reactions should be reported to the Vaccine Adverse Event Reporting System (VAERS). Your doctor should file this report, or you can do it yourself through  the VAERS web site at www.vaers. [facility].gov, or by calling 8-204.576.1762. VAERS does not give medical advice. 6. The National Vaccine Injury Compensation Program    The [facility] Vaccine Injury Compensation Program (VICP) is a federal program that was created to compensate people who may have been injured by certain vaccines. Persons who believe they may have been injured by a vaccine can learn about the program and about filing a claim by calling 9-235.265.7201 or visiting the [employer] website at www.[facility].gov/vaccinecompensation. There is a time limit to file a claim for compensation. 7. How can I learn more?  Ask your healthcare provider. He or she can give you the vaccine package insert or suggest other sources of information.  Call your local or state health department.  Contact the Centers for Disease Control and Prevention (CDC):  - Call 5-211.916.2999 (1-800-CDC-INFO) or  - Visit CDCs website at www.cdc.gov/flu    Vaccine Information Statement   Inactivated Influenza Vaccine   8/7/2015  42 NIALL Goldberg [id number]    Department of Health and Human Services  Centers for Disease Control and Prevention    Office Use Only

## 2017-10-20 NOTE — PROGRESS NOTES
Subjective:      David Morton is a 75 y.o. female who presents today for follow up of her diabetes mellitus type 2, hypertension, and hyperlipidemia. Exercise - walking regularly     She has been trying to maintain a diabetes mellitus diet. Her  is also a diabetic. No weight loss though. She denies polyuria, polydipsia, nocturia, nausea/vomiting, bowel changes, chest pain, syncope, dyspnea or lightheadedness. Patient Active Problem List   Diagnosis Code    Essential hypertension I10    History of screening mammography Z92.89    Pap smear for cervical cancer screening Z12.4    Screen for colon cancer Z12.11    [facility] E11.9    History of bone density study Z92.89    Hyperlipidemia E78.5    Diabetic eye exam ([facility] E11.9, Z01.00    Advance directive in chart Z78.9     Current Outpatient Prescriptions   Medication Sig Dispense Refill    losartan-hydroCHLOROthiazide (HYZAAR) 100-12.5 mg per tablet TAKE 1 TAB BY MOUTH DAILY. 90 Tab 0    atorvastatin (LIPITOR) 10 mg tablet TAKE 1 TABLET BY MOUTH EVERY DAY 90 Tab 0    omega-3 fatty acids-vitamin e (FISH OIL) 1,000 mg cap Take 4 Caps by mouth daily.  CHOLECALCIFEROL, VITAMIN D3, (VITAMIN D3 PO) Take 2,000 Units by mouth daily.  CYANOCOBALAMIN, VITAMIN B-12, (VITAMIN B-12 PO) Take  by mouth. Review of Systems    Pertinent items are noted in HPI. Objective:      Wt Readings from Last 3 Encounters:   10/20/17 180 lb 6.4 oz (81.8 kg)   06/23/17 182 lb (82.6 kg)   02/20/17 181 lb (82.1 kg)     Temp Readings from Last 3 Encounters:   10/20/17 97.1 °F (36.2 °C) (Oral)   06/23/17 96.5 °F (35.8 °C) (Oral)   02/20/17 99.2 °F (37.3 °C) (Oral)     BP Readings from Last 3 Encounters:   10/20/17 128/88   06/23/17 136/84   02/20/17 132/86     Pulse Readings from Last 3 Encounters:   10/20/17 86   06/23/17 (!) 55   02/20/17 62       Visit Vitals    /88 (BP 1 Location: Left arm, BP Patient Position: Sitting)    Pulse 86    Temp 97.1 °F (36.2 °C) (Oral)    Resp 14    Ht 5' 5.5\" (1.664 m)    Wt 180 lb 6.4 oz (81.8 kg)    SpO2 96%    BMI 29.56 kg/m2     General appearance: alert, cooperative, no distress, appears stated age  Head: Normocephalic, without obvious abnormality, atraumatic  Neck: supple, symmetrical, trachea midline, no adenopathy, no carotid bruit and no JVD  Lungs: clear to auscultation bilaterally  Heart: regular rate and rhythm, S1, S2 normal, no murmur, click, rub or gallop  Extremities: extremities normal, atraumatic, no cyanosis or edema  Pulses: 2+ and symmetric    Assessment/Plan:     1. Controlled type 2 diabetes mellitus without complication, without long-term current use of insulin ([address]  -check labs at this time.  -encouraged her to continue with her regular walking and diabetes mellitus diet. - METABOLIC PANEL, COMPREHENSIVE  - HEMOGLOBIN A1C WITH EAG  - MICROALBUMIN, UR, RAND W/ MICROALBUMIN/CREA RATIO    2. Benign hypertension  -I evaluated and recommended to continue current doses of medications. 3. Mixed hyperlipidemia  -last fasting lipid panel done 6/2017 and controlled on current dose of lipitor    4. Encounter for long-term (current) use of medications      5. Encounter for immunization  -received her flu vaccine today without any complications. - Influenza virus vaccine ([address]) 72 years and older ([id number])  - Administration fee () for Medicare insured patients     Orders Placed This Encounter    Influenza virus vaccine ([address]) 72 years and older ([id number])    METABOLIC PANEL, COMPREHENSIVE    HEMOGLOBIN A1C WITH EAG    MICROALBUMIN, UR, RAND W/ MICROALBUMIN/CREA RATIO    Administration fee () for Medicare insured patients      Follow-up Disposition:     Follow up 4 months      Return if symptoms worsen or fail to improve.    Advised patient to call back or return to office if symptoms worsen/change/persist.     Discussed expected course/resolution/complications of diagnosis in detail with patient. Medication risks/benefits/costs/interactions/alternatives discussed with patient. Patient was given an after visit summary which includes diagnoses, current medications, & vitals. Patient expressed understanding with the diagnosis and plan.

## 2017-10-20 NOTE — MR AVS SNAPSHOT
Visit Information Date & Time Provider Department Dept. Phone Encounter #  
 10/20/2017  9:20 AM Luis Nguyen MD [facility] [phone] Follow-up Instructions Return in about 4 months (around 2/20/2018) for diabetes type 2, hypertension. Upcoming Health Maintenance Date Due HEMOGLOBIN A1C Q6M 12/26/2017 FOOT EXAM Q1 2/20/2018 MEDICARE YEARLY EXAM 2/21/2018 GLAUCOMA SCREENING Q2Y 4/29/2018 EYE EXAM RETINAL OR DILATED Q1 6/23/2018 MICROALBUMIN Q1 6/26/2018 LIPID PANEL Q1 6/26/2018 BREAST CANCER SCRN MAMMOGRAM 10/11/2019 COLONOSCOPY 2/18/2023 DTaP/Tdap/Td series (2 - Td) 10/1/2025 Allergies as of 10/20/2017  Review Complete On: 10/20/2017 By: Luis Nguyen MD  
  
 Severity Noted Reaction Type Reactions Cocoa  09/28/2015    Unknown (comments) Pt states she is not allergic to Cocoa; just Coconut. Coconut  06/23/2017    Swelling Swelling of the tongue Peanut  05/13/2015    Swelling Tongue swelling Current Immunizations  Reviewed on 2/20/2017 Name Date Influenza High Dose Vaccine PF  Incomplete, 10/19/2016 Influenza Vaccine 9/16/2013 Influenza Vaccine Whole 10/1/2012 Pneumococcal Conjugate (PCV-13) 6/27/2016 Pneumococcal Polysaccharide (PPSV-23) 2/20/2017 Tdap 10/1/2015 Not reviewed this visit You Were Diagnosed With   
  
 Codes Comments Controlled type 2 diabetes mellitus without complication, without long-term current use of insulin ([facility]    -  Primary ICD-10-CM: E11.9 ICD-9-CM: 250.00 Benign hypertension     ICD-10-CM: I10 
ICD-9-CM: 401.1 Mixed hyperlipidemia     ICD-10-CM: E78.2 ICD-9-CM: 272.2 Encounter for long-term (current) use of medications     ICD-10-CM: Z79.899 ICD-9-CM: V58.69 Encounter for immunization     ICD-10-CM: O69 ICD-9-CM: V03.89 Vitals BP Pulse Temp Resp Height(growth percentile) Weight(growth percentile) 128/88 (BP 1 Location: Left arm, BP Patient Position: Sitting) 86 97.1 °F (36.2 °C) (Oral) 14 5' 5.5\" (1.664 m) 180 lb 6.4 oz (81.8 kg) SpO2 BMI OB Status Smoking Status 96% 29.56 kg/m2 Postmenopausal Never Smoker Vitals History BMI and BSA Data Body Mass Index Body Surface Area  
 29.56 kg/m 2 1.94 m 2 Preferred Pharmacy Pharmacy Name Phone [id number] Nazia Kirby, [address] [facility] [phone] Your Updated Medication List  
  
   
This list is accurate as of: 10/20/17  9:35 AM.  Always use your most recent med list.  
  
  
  
  
 atorvastatin 10 mg tablet Commonly known as:  LIPITOR  
TAKE 1 TABLET BY MOUTH EVERY DAY  
  
 FISH OIL 1,000 mg Cap Generic drug:  omega-3 fatty acids-vitamin e Take 4 Caps by mouth daily. losartan-hydroCHLOROthiazide 100-12.5 mg per tablet Commonly known as:  HYZAAR  
TAKE 1 TAB BY MOUTH DAILY. VITAMIN B-12 PO Take  by mouth. VITAMIN D3 PO Take 2,000 Units by mouth daily. We Performed the Following ADMIN INFLUENZA VIRUS VAC [ [facility]] HEMOGLOBIN A1C WITH EAG [96353 CPT(R)] INFLUENZA VIRUS VACCINE, HIGH DOSE SEASONAL, PRESERVATIVE FREE [32221 CPT(R)] METABOLIC PANEL, COMPREHENSIVE [10760 CPT(R)] MICROALBUMIN, UR, RAND W/ MICROALBUMIN/CREA RATIO [id number] CPT(R)] Follow-up Instructions Return in about 4 months (around 2/20/2018) for diabetes type 2, hypertension. To-Do List   
 10/11/2018 10:00 AM  
  Appointment with [facility] [place] [facility] 3 at [address] [place] [address] ([phone]) Shower or bathe using soap and water. Do not use deodorant, powder, perfumes, or lotion the day of your exam.  If your prior mammograms were not performed at [facility] please bring films with you or forward prior images 2 days before your procedure.   Check in at registration 15min before your appointment time unless you were instructed to do otherwise. A script is not necessary, but if you have one, please bring it on the day of the mammogram or have it faxed to the department. [facility] [phone] [facility]  [phone] [facility] [address]  [phone] [facility] [phone] [facility] 2660 [facility] Meche Hennessy [phone] Patient Instructions Vaccine Information Statement Influenza (Flu) Vaccine (Inactivated or Recombinant): What you need to know Many Vaccine Information Statements are available in Welsh and other languages. See www.immunize.org/vis Hojas de Información Sobre Vacunas están disponibles en Español y en muchos otros idiomas. Visite www.immunize.org/vis 1. Why get vaccinated? Influenza (flu) is a contagious disease that spreads around the [facility] every year, usually between October and May. Flu is caused by influenza viruses, and is spread mainly by coughing, sneezing, and close contact. Anyone can get flu. Flu strikes suddenly and can last several days. Symptoms vary by age, but can include: 
 fever/chills  sore throat  muscle aches  fatigue  cough  headache  runny or stuffy nose Flu can also lead to pneumonia and blood infections, and cause diarrhea and seizures in children. If you have a medical condition, such as heart or lung disease, flu can make it worse. Flu is more dangerous for some people. Infants and young children, people 72years of age and older, pregnant women, and people with certain health conditions or a weakened immune system are at greatest risk. Each year thousands of people in the [facility] die from flu, and many more are hospitalized. Flu vaccine can: 
 keep you from getting flu, 
 make flu less severe if you do get it, and 
 keep you from spreading flu to your family and other people. 2. Inactivated and recombinant flu vaccines A dose of flu vaccine is recommended every flu season. Children 6 months through 6years of age may need two doses during the same flu season. Everyone else needs only one dose each flu season. Some inactivated flu vaccines contain a very small amount of a mercury-based preservative called thimerosal. Studies have not shown thimerosal in vaccines to be harmful, but flu vaccines that do not contain thimerosal are available. There is no live flu virus in flu shots. They cannot cause the flu. There are many flu viruses, and they are always changing. Each year a new flu vaccine is made to protect against three or four viruses that are likely to cause disease in the upcoming flu season. But even when the vaccine doesnt exactly match these viruses, it may still provide some protection Flu vaccine cannot prevent: 
 flu that is caused by a virus not covered by the vaccine, or 
 illnesses that look like flu but are not. It takes about 2 weeks for protection to develop after vaccination, and protection lasts through the flu season. 3. Some people should not get this vaccine Tell the person who is giving you the vaccine:  If you have any severe, life-threatening allergies. If you ever had a life-threatening allergic reaction after a dose of flu vaccine, or have a severe allergy to any part of this vaccine, you may be advised not to get vaccinated. Most, but not all, types of flu vaccine contain a small amount of egg protein.  If you ever had Guillain-Barré Syndrome (also called GBS). Some people with a history of GBS should not get this vaccine. This should be discussed with your doctor.  If you are not feeling well. It is usually okay to get flu vaccine when you have a mild illness, but you might be asked to come back when you feel better. 4. Risks of a vaccine reaction With any medicine, including vaccines, there is a chance of reactions. These are usually mild and go away on their own, but serious reactions are also possible. Most people who get a flu shot do not have any problems with it. Minor problems following a flu shot include:  
 soreness, redness, or swelling where the shot was given  hoarseness  sore, red or itchy eyes  cough  fever  aches  headache  itching  fatigue If these problems occur, they usually begin soon after the shot and last 1 or 2 days. More serious problems following a flu shot can include the following:  There may be a small increased risk of Guillain-Barré Syndrome (GBS) after inactivated flu vaccine. This risk has been estimated at 1 or 2 additional cases per million people vaccinated. This is much lower than the risk of severe complications from flu, which can be prevented by flu vaccine.  Young children who get the flu shot along with pneumococcal vaccine (PCV13) and/or DTaP vaccine at the same time might be slightly more likely to have a seizure caused by fever. Ask your doctor for more information. Tell your doctor if a child who is getting flu vaccine has ever had a seizure. Problems that could happen after any injected vaccine:  People sometimes faint after a medical procedure, including vaccination. Sitting or lying down for about 15 minutes can help prevent fainting, and injuries caused by a fall. Tell your doctor if you feel dizzy, or have vision changes or ringing in the ears.  Some people get severe pain in the shoulder and have difficulty moving the arm where a shot was given. This happens very rarely.  Any medication can cause a severe allergic reaction. Such reactions from a vaccine are very rare, estimated at about 1 in a million doses, and would happen within a few minutes to a few hours after the vaccination. As with any medicine, there is a very remote chance of a vaccine causing a serious injury or death. The safety of vaccines is always being monitored. For more information, visit: www.cdc.gov/vaccinesafety/ 
 
 
The [facility] Vaccine Injury Compensation Program (VICP) is a federal program that was created to compensate people who may have been injured by certain vaccines. Persons who believe they may have been injured by a vaccine can learn about the program and about filing a claim by calling 3-661.817.9138 or visiting the [address] [employer] website at www.[facility].gov/vaccinecompensation. There is a time limit to file a claim for compensation. 7. How can I learn more?  Ask your healthcare provider. He or she can give you the vaccine package insert or suggest other sources of information.  Call your local or state health department.  Contact the Centers for Disease Control and Prevention (CDC): 
- Call 5-919.358.6036 (1-800-CDC-INFO) or 
- Visit CDCs website at www.cdc.gov/flu Vaccine Information Statement Inactivated Influenza Vaccine 8/7/2015 
[address] NI[address] Lockhart [id number]-[id number] Department of Health and [place] Centers for Disease Control and Prevention Office Use Only Introducing [facility]! [facility] introduces [employer] patient portal. Now you can access parts of your medical record, email your doctor's office, and request medication refills online. 1. In your internet browser, go to [email or web address] 2. Click on the First Time User? Click Here link in the Sign In box. You will see the New Member Sign Up page. 3. Enter your [employer] Access Code exactly as it appears below. You will not need to use this code after youve completed the sign-up process. If you do not sign up before the expiration date, you must request a new code. · [employer] Access Code: [email or web address] Expires: 1/9/2018 10:42 AM 
 
4. Enter the last four digits of your Social Security Number (xxxx) and Date of Birth (mm/dd/yyyy) as indicated and click Submit. You will be taken to the next sign-up page. 5. Create a [employer] ID. This will be your [employer] login ID and cannot be changed, so think of one that is secure and easy to remember. 6. Create a [employer] password. You can change your password at any time. 7. Enter your Password Reset Question and Answer. This can be used at a later time if you forget your password. 8. Enter your e-mail address. You will receive e-mail notification when new information is available in [address] 9. Click Sign Up. You can now view and download portions of your medical record. 10. Click the Download Summary menu link to download a portable copy of your medical information. If you have questions, please visit the Frequently Asked Questions section of the [employer] website. Remember, [employer] is NOT to be used for urgent needs. For medical emergencies, dial 911. Now available from your iPhone and Android! Please provide this summary of care documentation to your next provider. Your primary care clinician is listed as Danica Montalvo. If you have any questions after today's visit, please call [phone].

## 2017-10-21 LAB
ALBUMIN SERPL-MCNC: 4.4 G/DL (ref 3.6–4.8)
ALBUMIN/CREAT UR: 11.2 MG/G CREAT (ref 0–30)
ALBUMIN/GLOB SERPL: 1.4 {RATIO} (ref 1.2–2.2)
ALP SERPL-CCNC: 136 IU/L (ref 39–117)
ALT SERPL-CCNC: 24 IU/L (ref 0–32)
AST SERPL-CCNC: 20 IU/L (ref 0–40)
BILIRUB SERPL-MCNC: 1 MG/DL (ref 0–1.2)
BUN SERPL-MCNC: 15 MG/DL (ref 8–27)
BUN/CREAT SERPL: 21 (ref 12–28)
CALCIUM SERPL-MCNC: 10.6 MG/DL (ref 8.7–10.3)
CHLORIDE SERPL-SCNC: 102 MMOL/L (ref 96–106)
CO2 SERPL-SCNC: 24 MMOL/L (ref 18–29)
CREAT SERPL-MCNC: 0.71 MG/DL (ref 0.57–1)
CREAT UR-MCNC: 116.2 MG/DL
EST. AVERAGE GLUCOSE BLD GHB EST-MCNC: 140 MG/DL
GFR SERPLBLD CREATININE-BSD FMLA CKD-EPI: 101 ML/MIN/1.73
GFR SERPLBLD CREATININE-BSD FMLA CKD-EPI: 88 ML/MIN/1.73
GLOBULIN SER CALC-MCNC: 3.2 G/DL (ref 1.5–4.5)
GLUCOSE SERPL-MCNC: 116 MG/DL (ref 65–99)
HBA1C MFR BLD: 6.5 % (ref 4.8–5.6)
MICROALBUMIN UR-MCNC: 13 UG/ML
POTASSIUM SERPL-SCNC: 4 MMOL/L (ref 3.5–5.2)
PROT SERPL-MCNC: 7.6 G/DL (ref 6–8.5)
SODIUM SERPL-SCNC: 143 MMOL/L (ref 134–144)

## 2017-10-23 NOTE — PROGRESS NOTES
Alk phos slight increase. Calcium elevated slightly, but stable. Will recheck both at next visit.  Letter sent 10/23/2017

## 2018-03-12 RX ORDER — LOSARTAN POTASSIUM AND HYDROCHLOROTHIAZIDE 12.5; 1 MG/1; MG/1
TABLET ORAL
Qty: 90 TAB | Refills: 0 | Status: SHIPPED | OUTPATIENT
Start: 2018-03-12 | End: 2018-06-05 | Stop reason: SDUPTHER

## 2018-03-21 ENCOUNTER — OFFICE VISIT (OUTPATIENT)
Dept: INTERNAL MEDICINE CLINIC | Age: 68
End: 2018-03-21

## 2018-03-21 ENCOUNTER — HOSPITAL ENCOUNTER (OUTPATIENT)
Dept: LAB | Age: 68
Discharge: HOME | End: 2018-03-21
Payer: MEDICARE

## 2018-03-21 VITALS
HEART RATE: 64 BPM | SYSTOLIC BLOOD PRESSURE: 120 MMHG | HEIGHT: 66 IN | OXYGEN SATURATION: 95 % | WEIGHT: 180 LBS | TEMPERATURE: 97.4 F | BODY MASS INDEX: 28.93 KG/M2 | DIASTOLIC BLOOD PRESSURE: 78 MMHG | RESPIRATION RATE: 14 BRPM

## 2018-03-21 DIAGNOSIS — Z79.899 ENCOUNTER FOR LONG-TERM (CURRENT) USE OF MEDICATIONS: ICD-10-CM

## 2018-03-21 DIAGNOSIS — E11.9 CONTROLLED TYPE 2 DIABETES MELLITUS WITHOUT COMPLICATION, WITHOUT LONG-TERM CURRENT USE OF INSULIN (HCC): ICD-10-CM

## 2018-03-21 DIAGNOSIS — E78.2 MIXED HYPERLIPIDEMIA: ICD-10-CM

## 2018-03-21 DIAGNOSIS — I10 BENIGN HYPERTENSION: ICD-10-CM

## 2018-03-21 DIAGNOSIS — Z00.00 MEDICARE ANNUAL WELLNESS VISIT, SUBSEQUENT: Primary | ICD-10-CM

## 2018-03-21 PROCEDURE — 83036 HEMOGLOBIN GLYCOSYLATED A1C: CPT

## 2018-03-21 PROCEDURE — 82043 UR ALBUMIN QUANTITATIVE: CPT

## 2018-03-21 PROCEDURE — 80053 COMPREHEN METABOLIC PANEL: CPT

## 2018-03-21 PROCEDURE — 85025 COMPLETE CBC W/AUTO DIFF WBC: CPT

## 2018-03-21 PROCEDURE — 80061 LIPID PANEL: CPT

## 2018-03-21 PROCEDURE — 36415 COLL VENOUS BLD VENIPUNCTURE: CPT

## 2018-03-21 RX ORDER — ATORVASTATIN CALCIUM 10 MG/1
TABLET, FILM COATED ORAL
Qty: 90 TAB | Refills: 1 | Status: SHIPPED | OUTPATIENT
Start: 2018-03-21 | End: 2018-08-29 | Stop reason: SDUPTHER

## 2018-03-21 RX ORDER — PENICILLIN V POTASSIUM 500 MG/1
TABLET, FILM COATED ORAL
Refills: 0 | COMMUNITY
Start: 2018-03-12 | End: 2018-03-21 | Stop reason: ALTCHOICE

## 2018-03-21 RX ORDER — SODIUM, POTASSIUM,MAG SULFATES 17.5-3.13G
SOLUTION, RECONSTITUTED, ORAL ORAL
Refills: 0 | COMMUNITY
Start: 2017-12-19 | End: 2018-03-21 | Stop reason: ALTCHOICE

## 2018-03-21 NOTE — PROGRESS NOTES
Subjective:      Juanito Verduzco is a 75 y.o. female who presents today for follow up of her diet controlled diabetes mellitus type 2, hypertension and hyperlipidemia. She is also present for her medicare wellness exam.     She has been doing well. Trying to maintain a diabetes mellitus diet. Her  is also diabetic. She denies any new concerns. Patient Active Problem List   Diagnosis Code    Essential hypertension I10    History of screening mammography Z92.89    Pap smear for cervical cancer screening Z12.4    Screen for colon cancer Z12.11    Diabetes mellitus, type 2 ([facility] E11.9    History of bone density study Z92.89    Hyperlipidemia E78.5    [facility] E11.9, Z01.00    Advance directive in chart Z78.9     Current Outpatient Prescriptions   Medication Sig Dispense Refill    atorvastatin (LIPITOR) 10 mg tablet TAKE 1 TABLET BY MOUTH EVERY DAY 90 Tab 1    losartan-hydroCHLOROthiazide (HYZAAR) 100-12.5 mg per tablet TAKE 1 TAB BY MOUTH DAILY. 90 Tab 0    CHOLECALCIFEROL, VITAMIN D3, (VITAMIN D3 PO) Take 2,000 Units by mouth daily.  CYANOCOBALAMIN, VITAMIN B-12, (VITAMIN B-12 PO) Take  by mouth. Review of Systems    Pertinent items are noted in HPI. Objective:     Visit Vitals    /78 (BP 1 Location: Left arm, BP Patient Position: Sitting)    Pulse 64    Temp 97.4 °F (36.3 °C) (Oral)    Resp 14    Ht 5' 5.5\" (1.664 m)    Wt 180 lb (81.6 kg)    SpO2 95%    BMI 29.5 kg/m2     General appearance: alert, cooperative, no distress, appears stated age  Head: Normocephalic, without obvious abnormality, atraumatic  Neck: supple, symmetrical, trachea midline, no adenopathy, no carotid bruit and no JVD  Lungs: clear to auscultation bilaterally  Heart: regular rate and rhythm, S1, S2 normal, no murmur, click, rub or gallop  Abdomen: soft, non-tender.  Bowel sounds normal. No masses,  no organomegaly  Extremities: extremities normal, atraumatic, no cyanosis or edema  Pulses: 2+ and symmetric    Diabetic foot exam:     Left: Reflexes 1+     Vibratory sensation normal    Proprioception normal   Sharp/dull discrimination normal    Filament test normal sensation with micro filament   Pulse DP: 2+ (normal)   Pulse PT: 2+ (normal)   Deformities: None  Right: Reflexes 1+   Vibratory sensation normal   Proprioception normal   Sharp/dull discrimination normal   Filament test normal sensation with micro filament   Pulse DP: 2+ (normal)   Pulse PT: 2+ (normal)   Deformities: None    Assessment/Plan:     1. Medicare annual wellness visit, subsequent  -completed today    2. Controlled type 2 diabetes mellitus without complication, without long-term current use of insulin (HCC)  -appears in control. Check labs at this time. -  DIABETES FOOT EXAM  - CBC WITH AUTOMATED DIFF  - METABOLIC PANEL, COMPREHENSIVE  - HEMOGLOBIN A1C WITH EAG  - MICROALBUMIN, UR, RAND W/ MICROALB/CREAT RATIO    3. Benign hypertension  -I evaluated and recommended to continue current doses of medications. - CBC WITH AUTOMATED DIFF  - METABOLIC PANEL, COMPREHENSIVE    4. Mixed hyperlipidemia  -due for fasting lipid panel today. - METABOLIC PANEL, COMPREHENSIVE  - LIPID PANEL    5. Encounter for long-term (current) use of medications        Follow-up Disposition:     Follow up 4 months      Return if symptoms worsen or fail to improve. Advised patient to call back or return to office if symptoms worsen/change/persist.     Discussed expected course/resolution/complications of diagnosis in detail with patient. Medication risks/benefits/costs/interactions/alternatives discussed with patient. Patient was given an after visit summary which includes diagnoses, current medications, & vitals. Patient expressed understanding with the diagnosis and plan.          This is the Subsequent Medicare Annual Wellness Exam, performed 12 months or more after the Initial AWV or the last Subsequent LUIZ    I have reviewed the patient's medical history in detail and updated the computerized patient record. History     Past Medical History:   Diagnosis Date    Pap smear for cervical cancer screening 3/22/2012      Past Surgical History:   Procedure Laterality Date    HX COLONOSCOPY  02/02/2018    nml per pt    HX WISDOM TEETH EXTRACTION Right 03/12/2018    x1 upper     Current Outpatient Prescriptions   Medication Sig Dispense Refill    losartan-hydroCHLOROthiazide (HYZAAR) 100-12.5 mg per tablet TAKE 1 TAB BY MOUTH DAILY. 90 Tab 0    atorvastatin (LIPITOR) 10 mg tablet TAKE 1 TABLET BY MOUTH EVERY DAY 90 Tab 1    CHOLECALCIFEROL, VITAMIN D3, (VITAMIN D3 PO) Take 2,000 Units by mouth daily.  CYANOCOBALAMIN, VITAMIN B-12, (VITAMIN B-12 PO) Take  by mouth. Allergies   Allergen Reactions    Avocado Swelling     Tongue swelling    Cocoa Unknown (comments)     Pt states she is not allergic to Cocoa; just Coconut.  Coconut Swelling     Swelling of the tongue     Peanut Swelling     Tongue swelling     Family History   Problem Relation Age of Onset    Breast Cancer Mother      29's     Social History   Substance Use Topics    Smoking status: Never Smoker    Smokeless tobacco: Never Used    Alcohol use No     Patient Active Problem List   Diagnosis Code    Essential hypertension I10    History of screening mammography Z92.89    Pap smear for cervical cancer screening Z12.4    Screen for colon cancer Z12.11    Diabetes mellitus, type 2 ([facility] E11.9    History of bone density study Z92.89    Hyperlipidemia E78.5    Diabetic eye exam ([facility] E11.9, Z01.00    Advance directive in chart Z78.9       Depression Risk Factor Screening:     PHQ over the last two weeks 10/20/2017   Little interest or pleasure in doing things Not at all   Feeling down, depressed or hopeless Not at all   Total Score PHQ 2 0     Alcohol Risk Factor Screening:    You do not drink alcohol or very rarely. Functional Ability and Level of Safety:   Hearing Loss  Hearing is good. Activities of Daily Living  The home contains: no safety equipment. Patient does total self care    Fall Risk  Fall Risk Assessment, last 12 mths 10/20/2017   Able to walk? Yes   Fall in past 12 months? No       Abuse Screen  Patient is not abused    Cognitive Screening   Evaluation of Cognitive Function:  Has your family/caregiver stated any concerns about your memory: no  Normal    Patient Care Team   Patient Care Team:  Arely Neal MD as PCP - General (Internal Medicine)    Assessment/Plan   Education and counseling provided:  Are appropriate based on today's review and evaluation    Diagnoses and all orders for this visit:    1.  Medicare annual wellness visit, subsequent    Other orders  -     atorvastatin (LIPITOR) 10 mg tablet; TAKE 1 TABLET BY MOUTH EVERY DAY

## 2018-03-21 NOTE — PROGRESS NOTES
Patient's identity verified with two patient identifiers (name and date of birth). 1. Have you been to the ER, urgent care clinic since your last visit? Hospitalized since your last visit? No  2. Have you seen or consulted any other health care providers outside of the [address] [place] [address] since your last visit? Include any pap smears or colon screening. Yes, colonoscopy 2/2/[facility], in media. Chief Complaint   Patient presents with    Diabetes     4 month follow up, due for foot exam.    Hypertension     4 month follow up   [employer] Visit     Due for Medicare wellness. Fasting. Health Maintenance Due   Topic Date Due    FOOT EXAM Q1   Has not had. 02/20/2018    MEDICARE YEARLY EXAM   due 03/14/2018    HEMOGLOBIN A1C Q6M   due 04/20/2018    GLAUCOMA SCREENING Q2Y   Done per patient, q6m, Dr. Colie Dancer, scheduled. 04/29/2018     Reviewed record in preparation for visit and have obtained necessary documentation.     Wt Readings from Last 3 Encounters:   03/21/18 180 lb (81.6 kg)   10/20/17 180 lb 6.4 oz (81.8 kg)   06/23/17 182 lb (82.6 kg)     Temp Readings from Last 3 Encounters:   03/21/18 97.4 °F (36.3 °C) (Oral)   10/20/17 97.1 °F (36.2 °C) (Oral)   06/23/17 96.5 °F (35.8 °C) (Oral)     BP Readings from Last 3 Encounters:   03/21/18 120/78   10/20/17 128/88   06/23/17 136/84     Pulse Readings from Last 3 Encounters:   03/21/18 64   10/20/17 86   06/23/17 (!) 55

## 2018-03-21 NOTE — MR AVS SNAPSHOT
[address] [place] [address], [address] [place] [address] 
[phone] Patient: Juanito Verduzco MRN: [id number] PHC:[id number] Visit Information Date & Time Provider Department Dept. Phone Encounter #  
 3/21/2018  9:00 AM Fide Mathis MD [place] [address] [facility] [phone] [id number] Follow-up Instructions Return in about 4 months (around 7/21/2018) for diabetes type 2, hypertension, hyperlipidemia. Upcoming Health Maintenance Date Due  
 EYE EXAM RETINAL OR DILATED Q1 6/23/2018 LIPID PANEL Q1 6/26/2018 HEMOGLOBIN A1C Q6M 9/21/2018 MICROALBUMIN Q1 10/20/2018 FOOT EXAM Q1 3/21/2019 MEDICARE YEARLY EXAM 3/22/2019 GLAUCOMA SCREENING Q2Y 6/23/2019 BREAST CANCER SCRN MAMMOGRAM 10/11/2019 DTaP/Tdap/Td series (2 - Td) 10/1/2025 COLONOSCOPY 2/2/2028 Allergies as of 3/21/2018  Review Complete On: 3/21/2018 By: Fide Mathis MD  
  
 Severity Noted Reaction Type Reactions Avocado High 03/21/2018   Side Effect Swelling Tongue swelling Cocoa  09/28/2015    Unknown (comments) Pt states she is not allergic to Cocoa; just Coconut. Coconut  06/23/2017    Swelling Swelling of the tongue Peanut  05/13/2015    Swelling Tongue swelling Current Immunizations  Reviewed on 10/20/2017 Name Date Influenza High Dose Vaccine PF 10/20/2017  9:40 AM, 10/19/2016 Influenza Vaccine 9/16/2013 Influenza Vaccine Whole 10/1/2012 Pneumococcal Conjugate (PCV-13) 6/27/2016 Pneumococcal Polysaccharide (PPSV-23) 2/20/2017 Tdap 10/1/2015 Not reviewed this visit You Were Diagnosed With   
  
 Codes Comments Medicare annual wellness visit, subsequent    -  Primary ICD-10-CM: Z00.00 ICD-9-CM: V70.0 Controlled type 2 diabetes mellitus without complication, without long-term current use of insulin ([facility]     ICD-10-CM: E11.9 ICD-9-CM: 250.00  Benign hypertension     ICD-10-CM: I10 
 ICD-9-CM: 401.1 Mixed hyperlipidemia     ICD-10-CM: E78.2 ICD-9-CM: 272.2 Encounter for long-term (current) use of medications     ICD-10-CM: Z79.899 ICD-9-CM: V58.69 Diabetic eye exam ([facility]     ICD-10-CM: E11.9, Z01.00 ICD-9-CM: V72.0, 250.00 Vitals BP Pulse Temp Resp Height(growth percentile) Weight(growth percentile) 120/78 (BP 1 Location: Left arm, BP Patient Position: Sitting) 64 97.4 °F (36.3 °C) (Oral) 14 5' 5.5\" (1.664 m) 180 lb (81.6 kg) SpO2 BMI OB Status Smoking Status 95% 29.5 kg/m2 Postmenopausal Never Smoker Vitals History BMI and BSA Data Body Mass Index Body Surface Area  
 29.5 kg/m 2 1.94 m 2 Preferred Pharmacy Pharmacy Name Phone [facility]/PHARMACY #[id number] [address] [phone] Your Updated Medication List  
  
   
This list is accurate as of 3/21/18  9:33 AM.  Always use your most recent med list.  
  
  
  
  
 atorvastatin 10 mg tablet Commonly known as:  LIPITOR  
TAKE 1 TABLET BY MOUTH EVERY DAY  
  
 losartan-hydroCHLOROthiazide 100-12.5 mg per tablet Commonly known as:  HYZAAR  
TAKE 1 TAB BY MOUTH DAILY. VITAMIN B-12 PO Take  by mouth. VITAMIN D3 PO Take 2,000 Units by mouth daily. Prescriptions Sent to Pharmacy Refills  
 atorvastatin (LIPITOR) 10 mg tablet 1 Sig: TAKE 1 TABLET BY MOUTH EVERY DAY Class: Normal  
 Pharmacy: [facility] [address], [address] [place] [address] Ph #: [phone] We Performed the Following CBC WITH AUTOMATED DIFF [31356 CPT(R)] HEMOGLOBIN A1C WITH EAG [95704 CPT(R)]  DIABETES FOOT EXAM [7 Custom] LIPID PANEL [57233 CPT(R)] METABOLIC PANEL, COMPREHENSIVE [14523 CPT(R)] MICROALBUMIN, UR, RAND W/ MICROALB/CREAT RATIO [id number] CPT(R)] Follow-up Instructions  Return in about 4 months (around 7/21/2018) for diabetes type 2, hypertension, hyperlipidemia. To-Do List   
 10/11/2018 10:00 AM  
  Appointment with [facility] 3 at [address] ([phone]) Shower or bathe using soap and water. Do not use deodorant, powder, perfumes, or lotion the day of your exam.  If your prior mammograms were not performed at [facility] 6 please bring films with you or forward prior images 2 days before your procedure. Check in at registration 15min before your appointment time unless you were instructed to do otherwise. A script is not necessary, but if you have one, please bring it on the day of the mammogram or have it faxed to the department. [facility] [phone] [facility]  [phone] [facility] 19 [facility]  [phone] [facility] [phone] [facility] 6188 [facility] [phone] Patient Instructions Medicare Wellness Visit, Female The best way to live healthy is to have a healthy lifestyle by eating a well-balanced diet, exercising regularly, limiting alcohol and stopping smoking. Regular physical exams and screening tests are another way to keep healthy. Preventive exams provided by your health care provider can find health problems before they become diseases or illnesses. Preventive services including immunizations, screening tests, monitoring and exams can help you take care of your own health. All people over age 72 should have a pneumovax  and and a prevnar shot to prevent pneumonia. These are once in a lifetime unless you and your provider decide differently. All people over 65 should have a yearly flu shot and a tetanus vaccine every 10 years. A bone mass density to screen for osteoporosis or thinning of the bones should be done every 2 years after 65. Screening for diabetes mellitus with a blood sugar test should be done every year.  
 
Glaucoma is a disease of the eye due to increased ocular pressure that can lead to blindness and it should be done every year by an eye professional. 
 
 Cardiovascular screening tests that check for elevated lipids (fatty part of blood) which can lead to heart disease and strokes should be done every 5 years. Colorectal screening that evaluates for blood or polyps in your colon should be done yearly as a stool test or every five years as a flexible sigmoidoscope or every 10 years as a colonoscopy up to age 76. Breast cancer screening with a mammogram is recommended biennially  for women age 54-69. Screening for cervical cancer with a pap smear and pelvic exam is recommended for women after age 72 years every 2 years up to age 79 or when the provider and patient decide to stop. If there is a history of cervical abnormalities or other increased risk for cancer then the test is recommended yearly. Hepatitis C screening is also recommended for anyone born between [birth year] through [address]. A shingles vaccine is also recommended once in a lifetime after age 61. Your Medicare Wellness Exam is recommended annually. Here is a list of your current Health Maintenance items with a due date: 
Health Maintenance Due Topic Date Due  
 Diabetic Foot Care  02/20/2018 [facility] Annual Well Visit  03/14/2018  Hemoglobin A1C    04/20/2018  Glaucoma Screening   04/29/2018 Introducing [facility] & HEALTH SERVICES! [employer] introduces [employer] patient portal. Now you can access parts of your medical record, email your doctor's office, and request medication refills online. 1. In your internet browser, go to [email or web address] 2. Click on the First Time User? Click Here link in the Sign In box. You will see the New Member Sign Up page. 3. Enter your [employer] Access Code exactly as it appears below. You will not need to use this code after youve completed the sign-up process. If you do not sign up before the expiration date, you must request a new code. · [employer] Access Code: [email or web address] Expires: 6/19/2018  9:14 AM 
 
 4. Enter the last four digits of your Social Security Number (xxxx) and Date of Birth (mm/dd/yyyy) as indicated and click Submit. You will be taken to the next sign-up page. 5. Create a [employer] ID. This will be your [employer] login ID and cannot be changed, so think of one that is secure and easy to remember. 6. Create a [employer] password. You can change your password at any time. 7. Enter your Password Reset Question and Answer. This can be used at a later time if you forget your password. 8. Enter your e-mail address. You will receive e-mail notification when new information is available in [address] 9. Click Sign Up. You can now view and download portions of your medical record. 10. Click the Download Summary menu link to download a portable copy of your medical information. If you have questions, please visit the Frequently Asked Questions section of the [employer] website. Remember, [employer] is NOT to be used for urgent needs. For medical emergencies, dial 911. Now available from your iPhone and Android! Please provide this summary of care documentation to your next provider. Your primary care clinician is listed as Danica Montalvo. If you have any questions after today's visit, please call [phone].

## 2018-03-21 NOTE — PATIENT INSTRUCTIONS

## 2018-03-22 LAB
ALBUMIN SERPL-MCNC: 4.5 G/DL (ref 3.6–4.8)
ALBUMIN/CREAT UR: 26.4 MG/G CREAT (ref 0–30)
ALBUMIN/GLOB SERPL: 1.4 {RATIO} (ref 1.2–2.2)
ALP SERPL-CCNC: 133 IU/L (ref 39–117)
ALT SERPL-CCNC: 21 IU/L (ref 0–32)
AST SERPL-CCNC: 18 IU/L (ref 0–40)
BASOPHILS # BLD AUTO: 0 X10E3/UL (ref 0–0.2)
BASOPHILS NFR BLD AUTO: 0 %
BILIRUB SERPL-MCNC: 0.7 MG/DL (ref 0–1.2)
BUN SERPL-MCNC: 16 MG/DL (ref 8–27)
BUN/CREAT SERPL: 20 (ref 12–28)
CALCIUM SERPL-MCNC: 10.9 MG/DL (ref 8.7–10.3)
CHLORIDE SERPL-SCNC: 105 MMOL/L (ref 96–106)
CHOLEST SERPL-MCNC: 130 MG/DL (ref 100–199)
CO2 SERPL-SCNC: 23 MMOL/L (ref 18–29)
CREAT SERPL-MCNC: 0.8 MG/DL (ref 0.57–1)
CREAT UR-MCNC: 86.3 MG/DL
EOSINOPHIL # BLD AUTO: 0.2 X10E3/UL (ref 0–0.4)
EOSINOPHIL NFR BLD AUTO: 2 %
ERYTHROCYTE [DISTWIDTH] IN BLOOD BY AUTOMATED COUNT: 15.9 % (ref 12.3–15.4)
EST. AVERAGE GLUCOSE BLD GHB EST-MCNC: 148 MG/DL
GFR SERPLBLD CREATININE-BSD FMLA CKD-EPI: 76 ML/MIN/1.73
GFR SERPLBLD CREATININE-BSD FMLA CKD-EPI: 88 ML/MIN/1.73
GLOBULIN SER CALC-MCNC: 3.2 G/DL (ref 1.5–4.5)
GLUCOSE SERPL-MCNC: 119 MG/DL (ref 65–99)
HBA1C MFR BLD: 6.8 % (ref 4.8–5.6)
HCT VFR BLD AUTO: 42.9 % (ref 34–46.6)
HDLC SERPL-MCNC: 43 MG/DL
HGB BLD-MCNC: 14.5 G/DL (ref 11.1–15.9)
IMM GRANULOCYTES # BLD: 0 X10E3/UL (ref 0–0.1)
IMM GRANULOCYTES NFR BLD: 0 %
INTERPRETATION, 910389: NORMAL
LDLC SERPL CALC-MCNC: 72 MG/DL (ref 0–99)
LYMPHOCYTES # BLD AUTO: 3.4 X10E3/UL (ref 0.7–3.1)
LYMPHOCYTES NFR BLD AUTO: 40 %
Lab: NORMAL
MCH RBC QN AUTO: 29 PG (ref 26.6–33)
MCHC RBC AUTO-ENTMCNC: 33.8 G/DL (ref 31.5–35.7)
MCV RBC AUTO: 86 FL (ref 79–97)
MICROALBUMIN UR-MCNC: 22.8 UG/ML
MONOCYTES # BLD AUTO: 0.4 X10E3/UL (ref 0.1–0.9)
MONOCYTES NFR BLD AUTO: 4 %
NEUTROPHILS # BLD AUTO: 4.5 X10E3/UL (ref 1.4–7)
NEUTROPHILS NFR BLD AUTO: 54 %
PLATELET # BLD AUTO: 218 X10E3/UL (ref 150–379)
POTASSIUM SERPL-SCNC: 3.8 MMOL/L (ref 3.5–5.2)
PROT SERPL-MCNC: 7.7 G/DL (ref 6–8.5)
RBC # BLD AUTO: 5 X10E6/UL (ref 3.77–5.28)
SODIUM SERPL-SCNC: 144 MMOL/L (ref 134–144)
TRIGL SERPL-MCNC: 77 MG/DL (ref 0–149)
VLDLC SERPL CALC-MCNC: 15 MG/DL (ref 5–40)
WBC # BLD AUTO: 8.4 X10E3/UL (ref 3.4–10.8)

## 2018-08-29 ENCOUNTER — HOSPITAL ENCOUNTER (OUTPATIENT)
Dept: LAB | Age: 68
Discharge: HOME | End: 2018-08-29
Payer: MEDICARE

## 2018-08-29 ENCOUNTER — OFFICE VISIT (OUTPATIENT)
Dept: INTERNAL MEDICINE CLINIC | Age: 68
End: 2018-08-29

## 2018-08-29 VITALS
RESPIRATION RATE: 14 BRPM | HEART RATE: 52 BPM | BODY MASS INDEX: 30.32 KG/M2 | SYSTOLIC BLOOD PRESSURE: 138 MMHG | DIASTOLIC BLOOD PRESSURE: 86 MMHG | TEMPERATURE: 98 F | WEIGHT: 182 LBS | OXYGEN SATURATION: 95 % | HEIGHT: 65 IN

## 2018-08-29 DIAGNOSIS — E11.9 DIABETES MELLITUS TYPE 2, DIET-CONTROLLED (HCC): Primary | ICD-10-CM

## 2018-08-29 DIAGNOSIS — E78.2 MIXED HYPERLIPIDEMIA: ICD-10-CM

## 2018-08-29 DIAGNOSIS — I10 BENIGN HYPERTENSION: ICD-10-CM

## 2018-08-29 DIAGNOSIS — Z79.899 ENCOUNTER FOR LONG-TERM (CURRENT) USE OF MEDICATIONS: ICD-10-CM

## 2018-08-29 PROCEDURE — 83036 HEMOGLOBIN GLYCOSYLATED A1C: CPT

## 2018-08-29 PROCEDURE — 82043 UR ALBUMIN QUANTITATIVE: CPT

## 2018-08-29 PROCEDURE — 80053 COMPREHEN METABOLIC PANEL: CPT

## 2018-08-29 PROCEDURE — 36415 COLL VENOUS BLD VENIPUNCTURE: CPT

## 2018-08-29 RX ORDER — LOSARTAN POTASSIUM AND HYDROCHLOROTHIAZIDE 12.5; 1 MG/1; MG/1
TABLET ORAL
Qty: 90 TAB | Refills: 1 | Status: SHIPPED | OUTPATIENT
Start: 2018-08-29 | End: 2019-01-07 | Stop reason: SDUPTHER

## 2018-08-29 RX ORDER — ATORVASTATIN CALCIUM 10 MG/1
TABLET, FILM COATED ORAL
Qty: 90 TAB | Refills: 1 | Status: SHIPPED | OUTPATIENT
Start: 2018-08-29 | End: 2019-01-07 | Stop reason: SDUPTHER

## 2018-08-29 NOTE — PROGRESS NOTES
Reviewed record in preparation for visit and have obtained necessary documentation. Identified pt with two pt identifiers(name and ). Health Maintenance Due Topic  HEMOGLOBIN A1C Q6M Chief Complaint Patient presents with  Diabetes 4 month f/u  Hypertension Wt Readings from Last 3 Encounters:  
18 182 lb (82.6 kg) 18 180 lb (81.6 kg) 10/20/17 180 lb 6.4 oz (81.8 kg) Temp Readings from Last 3 Encounters:  
18 98 °F (36.7 °C) (Oral) 18 97.4 °F (36.3 °C) (Oral) 10/20/17 97.1 °F (36.2 °C) (Oral) BP Readings from Last 3 Encounters:  
18 138/86  
18 120/78  
10/20/17 128/88 Pulse Readings from Last 3 Encounters:  
18 (!) 52  
18 64  
10/20/17 86 Learning Assessment: 
:  
 
Learning Assessment 2017 PRIMARY LEARNER Patient Patient Patient HIGHEST LEVEL OF EDUCATION - PRIMARY LEARNER  - > 4 YEARS OF COLLEGE SOME COLLEGE  
BARRIERS PRIMARY LEARNER - NONE NONE  
CO-LEARNER CAREGIVER - No No  
PRIMARY LANGUAGE ENGLISH ENGLISH ENGLISH  NEED - No No  
LEARNER PREFERENCE PRIMARY DEMONSTRATION LISTENING OTHER (COMMENT) LEARNING SPECIAL TOPICS - no no ANSWERED BY patient patient patient RELATIONSHIP SELF SELF SELF  
ASSESSMENT COMMENT - - no Depression Screening: 
:  
 
PHQ over the last two weeks 2018 Little interest or pleasure in doing things Not at all Feeling down, depressed, irritable, or hopeless Not at all Total Score PHQ 2 0 Fall Risk Assessment: 
:  
 
Fall Risk Assessment, last 12 mths 2018 Able to walk? Yes Fall in past 12 months? No  
 
 
Abuse Screening: 
:  
 
Abuse Screening Questionnaire 2018 2017 7/10/2014 Do you ever feel afraid of your partner? N N N Are you in a relationship with someone who physically or mentally threatens you? N N N Is it safe for you to go home?  Marylen Bath  
 
 
 Coordination of Care Questionnaire: 
:  
 
1) Have you been to an emergency room, urgent care clinic since your last visit? no  
Hospitalized since your last visit? no          
 
2) Have you seen or consulted any other health care providers outside of [address] [place] [address] since your last visit? no  (Include any pap smears or colon screenings in this section.) 3) Do you have an Advance Directive on file? yes

## 2018-08-29 NOTE — MR AVS SNAPSHOT
[address] [facility], [address] [place] [address] 
[phone] Patient: Emil Sánchez MRN: [id number] XVA:[birth year] Visit Information Date & Time Provider Department Dept. Phone Encounter #  
 8/29/2018  9:00 AM Fred Del Real MD [place] [address] [facility] [phone] Follow-up Instructions Return in about 4 months (around 12/29/2018) for hypertension, hyperlipidemia, diabetes type 2. Upcoming Health Maintenance Date Due HEMOGLOBIN A1C Q6M 9/21/2018 Influenza Age 5 to Adult 10/1/2018* FOOT EXAM Q1 3/21/2019 MICROALBUMIN Q1 3/21/2019 LIPID PANEL Q1 3/21/2019 MEDICARE YEARLY EXAM 3/22/2019 EYE EXAM RETINAL OR DILATED Q1 5/24/2019 BREAST CANCER SCRN MAMMOGRAM 10/11/2019 GLAUCOMA SCREENING Q2Y 5/24/2020 DTaP/Tdap/Td series (2 - Td) 10/1/2025 COLONOSCOPY 2/2/2028 *Topic was postponed. The date shown is not the original due date. Allergies as of 8/29/2018  Review Complete On: 8/29/2018 By: Fred Del Real MD  
  
 Severity Noted Reaction Type Reactions Avocado High 03/21/2018   Side Effect Swelling Tongue swelling Cocoa  09/28/2015    Unknown (comments) Pt states she is not allergic to Cocoa; just Coconut. Coconut  06/23/2017    Swelling Swelling of the tongue Peanut  05/13/2015    Swelling Tongue swelling Current Immunizations  Reviewed on 8/29/2018 Name Date Influenza High Dose Vaccine PF 10/20/2017  9:40 AM, 10/19/2016 Influenza Vaccine 9/16/2013 Influenza Vaccine Whole 10/1/2012 Pneumococcal Conjugate (PCV-13) 6/27/2016 Pneumococcal Polysaccharide (PPSV-23) 2/20/2017 Tdap 10/1/2015 Reviewed by Fred Del Real MD on 8/29/2018 at  9:03 AM  
You Were Diagnosed With   
  
 Codes Comments Diabetes mellitus type 2, diet-controlled ([facility]    -  Primary ICD-10-CM: E11.9 ICD-9-CM: 250.00  Benign hypertension     ICD-10-CM: I10 
 ICD-9-CM: 401.1 Mixed hyperlipidemia     ICD-10-CM: E78.2 ICD-9-CM: 272.2 Encounter for long-term (current) use of medications     ICD-10-CM: Z79.899 ICD-9-CM: V58.69 Vitals BP Pulse Temp Resp Height(growth percentile) Weight(growth percentile) 138/86 (BP 1 Location: Left arm, BP Patient Position: Sitting) (!) 52 98 °F (36.7 °C) (Oral) 14 5' 5\" (1.651 m) 182 lb (82.6 kg) SpO2 BMI OB Status Smoking Status 95% 30.29 kg/m2 Postmenopausal Never Smoker Vitals History BMI and BSA Data Body Mass Index Body Surface Area  
 30.29 kg/m 2 1.95 m 2 Preferred Pharmacy Pharmacy Name Phone [facility]/PHARMACY #[id number] Jalyn Hoffmann, [address] [place] [address] [phone] Your Updated Medication List  
  
   
This list is accurate as of 8/29/18  9:09 AM.  Always use your most recent med list.  
  
  
  
  
 atorvastatin 10 mg tablet Commonly known as:  LIPITOR  
TAKE 1 TABLET BY MOUTH EVERY DAY  
  
 losartan-hydroCHLOROthiazide 100-12.5 mg per tablet Commonly known as:  HYZAAR  
TAKE 1 TABLET BY MOUTH EVERY DAY  
  
 VITAMIN B-12 PO Take  by mouth. VITAMIN D3 PO Take 2,000 Units by mouth daily. Prescriptions Sent to Pharmacy Refills  
 losartan-hydroCHLOROthiazide (HYZAAR) 100-12.5 mg per tablet 1 Sig: TAKE 1 TABLET BY MOUTH EVERY DAY Class: Normal  
 Pharmacy: [facility]/pharmacy #[id number] [address] Ph #: [phone]  
 atorvastatin (LIPITOR) 10 mg tablet 1 Sig: TAKE 1 TABLET BY MOUTH EVERY DAY Class: Normal  
 Pharmacy: [facility]/pharmacy [address], [address] [place] [address] Ph #: [phone] We Performed the Following HEMOGLOBIN A1C WITH EAG [48738 CPT(R)] METABOLIC PANEL, COMPREHENSIVE [39726 CPT(R)] MICROALBUMIN, UR, RAND W/ MICROALB/CREAT RATIO [id number] CPT(R)] Follow-up Instructions Return in about 4 months (around 12/29/2018) for hypertension, hyperlipidemia, diabetes type 2. To-Do List   
 10/11/2018 10:00 AM  
  Appointment with [facility] VALERY 3 at [address] [place] [address] ([phone]) Shower or bathe using soap and water. Do not use deodorant, powder, perfumes, or lotion the day of your exam.  If your prior mammograms were not performed at [facility] 6 please bring films with you or forward prior images 2 days before your procedure. Check in at registration 15min before your appointment time unless you were instructed to do otherwise. A script is not necessary, but if you have one, please bring it on the day of the mammogram or have it faxed to the department. You are responsible for finding a method of transportation to your appointment. If you don't have transportation, please reschedule your appointment at least 24 hours in advance. [facility] [phone] [facility]  [phone] [facility] 19 [facility]  [phone] [facility] [phone] [address] [phone] Introducing [facility] & HEALTH SERVICES! [employer] introduces [employer] patient portal. Now you can access parts of your medical record, email your doctor's office, and request medication refills online. 1. In your internet browser, go to [email or web address] 2. Click on the First Time User? Click Here link in the Sign In box. You will see the New Member Sign Up page. 3. Enter your [employer] Access Code exactly as it appears below. You will not need to use this code after youve completed the sign-up process. If you do not sign up before the expiration date, you must request a new code. · [employer] Access Code: [facility] Expires: 11/27/2018  9:09 AM 
 
4. Enter the last four digits of your Social Security Number (xxxx) and Date of Birth (mm/dd/yyyy) as indicated and click Submit. You will be taken to the next sign-up page. 5. Create a [employer] ID. This will be your [employer] login ID and cannot be changed, so think of one that is secure and easy to remember. 6. Create a [employer] password. You can change your password at any time. 7. Enter your Password Reset Question and Answer. This can be used at a later time if you forget your password. 8. Enter your e-mail address. You will receive e-mail notification when new information is available in [address] 9. Click Sign Up. You can now view and download portions of your medical record. 10. Click the Download Summary menu link to download a portable copy of your medical information. If you have questions, please visit the Frequently Asked Questions section of the [employer] website. Remember, [employer] is NOT to be used for urgent needs. For medical emergencies, dial 911. Now available from your iPhone and Android! Please provide this summary of care documentation to your next provider. Your primary care clinician is listed as Danica Montalvo. If you have any questions after today's visit, please call [phone].

## 2018-08-30 LAB
ALBUMIN SERPL-MCNC: 4.2 G/DL (ref 3.6–4.8)
ALBUMIN/CREAT UR: 14.9 MG/G CREAT (ref 0–30)
ALBUMIN/GLOB SERPL: 1.4 {RATIO} (ref 1.2–2.2)
ALP SERPL-CCNC: 126 IU/L (ref 39–117)
ALT SERPL-CCNC: 20 IU/L (ref 0–32)
AST SERPL-CCNC: 21 IU/L (ref 0–40)
BILIRUB SERPL-MCNC: 0.9 MG/DL (ref 0–1.2)
BUN SERPL-MCNC: 12 MG/DL (ref 8–27)
BUN/CREAT SERPL: 16 (ref 12–28)
CALCIUM SERPL-MCNC: 10.5 MG/DL (ref 8.7–10.3)
CHLORIDE SERPL-SCNC: 106 MMOL/L (ref 96–106)
CO2 SERPL-SCNC: 21 MMOL/L (ref 20–29)
CREAT SERPL-MCNC: 0.73 MG/DL (ref 0.57–1)
CREAT UR-MCNC: 82.5 MG/DL
EST. AVERAGE GLUCOSE BLD GHB EST-MCNC: 146 MG/DL
GLOBULIN SER CALC-MCNC: 3.1 G/DL (ref 1.5–4.5)
GLUCOSE SERPL-MCNC: 121 MG/DL (ref 65–99)
HBA1C MFR BLD: 6.7 % (ref 4.8–5.6)
MICROALBUMIN UR-MCNC: 12.3 UG/ML
POTASSIUM SERPL-SCNC: 4 MMOL/L (ref 3.5–5.2)
PROT SERPL-MCNC: 7.3 G/DL (ref 6–8.5)
SODIUM SERPL-SCNC: 142 MMOL/L (ref 134–144)

## 2018-10-11 ENCOUNTER — HOSPITAL ENCOUNTER (OUTPATIENT)
Dept: MAMMOGRAPHY | Age: 68
Discharge: HOME | End: 2018-10-11
Attending: INTERNAL MEDICINE
Payer: MEDICARE

## 2018-10-11 DIAGNOSIS — Z12.31 VISIT FOR SCREENING MAMMOGRAM: ICD-10-CM

## 2018-10-11 PROCEDURE — 77067 SCR MAMMO BI INCL CAD: CPT

## 2019-01-07 ENCOUNTER — HOSPITAL ENCOUNTER (OUTPATIENT)
Dept: LAB | Age: 69
Discharge: HOME | End: 2019-01-07
Payer: MEDICARE

## 2019-01-07 ENCOUNTER — OFFICE VISIT (OUTPATIENT)
Dept: INTERNAL MEDICINE CLINIC | Age: 69
End: 2019-01-07

## 2019-01-07 VITALS
OXYGEN SATURATION: 97 % | SYSTOLIC BLOOD PRESSURE: 126 MMHG | DIASTOLIC BLOOD PRESSURE: 86 MMHG | HEART RATE: 57 BPM | RESPIRATION RATE: 18 BRPM | WEIGHT: 177.2 LBS | HEIGHT: 65 IN | BODY MASS INDEX: 29.52 KG/M2 | TEMPERATURE: 97.3 F

## 2019-01-07 DIAGNOSIS — Z79.899 ENCOUNTER FOR LONG-TERM (CURRENT) USE OF MEDICATIONS: ICD-10-CM

## 2019-01-07 DIAGNOSIS — I10 BENIGN HYPERTENSION: ICD-10-CM

## 2019-01-07 DIAGNOSIS — E78.2 MIXED HYPERLIPIDEMIA: ICD-10-CM

## 2019-01-07 DIAGNOSIS — E11.9 DIABETES MELLITUS TYPE 2, DIET-CONTROLLED (HCC): Primary | ICD-10-CM

## 2019-01-07 PROCEDURE — 85025 COMPLETE CBC W/AUTO DIFF WBC: CPT

## 2019-01-07 PROCEDURE — 80061 LIPID PANEL: CPT

## 2019-01-07 PROCEDURE — 82043 UR ALBUMIN QUANTITATIVE: CPT

## 2019-01-07 PROCEDURE — 36415 COLL VENOUS BLD VENIPUNCTURE: CPT

## 2019-01-07 PROCEDURE — 83036 HEMOGLOBIN GLYCOSYLATED A1C: CPT

## 2019-01-07 PROCEDURE — 80053 COMPREHEN METABOLIC PANEL: CPT

## 2019-01-07 RX ORDER — LOSARTAN POTASSIUM AND HYDROCHLOROTHIAZIDE 12.5; 1 MG/1; MG/1
TABLET ORAL
Qty: 90 TAB | Refills: 1 | Status: SHIPPED | OUTPATIENT
Start: 2019-01-07 | End: 2019-09-11 | Stop reason: SDUPTHER

## 2019-01-07 RX ORDER — ATORVASTATIN CALCIUM 10 MG/1
TABLET, FILM COATED ORAL
Qty: 90 TAB | Refills: 1 | Status: SHIPPED | OUTPATIENT
Start: 2019-01-07 | End: 2019-09-18 | Stop reason: SDUPTHER

## 2019-01-07 NOTE — PROGRESS NOTES
Subjective:  
  
Rhina Ornelas is a 70 y.o. female who presents today for follow up of her diabetes mellitus type 2 - diet controlled, hypertension and hyperlipidemia. She has lost 5 pounds since her last visit. She has been watching her portions. She denies polyuria, polydipsia, nocturia, nausea/vomiting, bowel changes, chest pain, syncope, dyspnea or lightheadedness. Patient Active Problem List  
Diagnosis Code  Essential hypertension I10  
 History of screening mammography Z92.89  
 Pap smear for cervical cancer screening Z12.4  Screen for colon cancer Z12.11  
 [facility] E11.9  History of bone density study Z92.89  
 Hyperlipidemia E78.5  Diabetic eye exam ([facility] Z01.00, E11.9  Advance directive in chart Z78.9 Current Outpatient Medications Medication Sig Dispense Refill  losartan-hydroCHLOROthiazide (HYZAAR) 100-12.5 mg per tablet TAKE 1 TABLET BY MOUTH EVERY DAY 90 Tab 1  
 atorvastatin (LIPITOR) 10 mg tablet TAKE 1 TABLET BY MOUTH EVERY DAY 90 Tab 1  
 CHOLECALCIFEROL, VITAMIN D3, (VITAMIN D3 PO) Take 2,000 Units by mouth daily.  CYANOCOBALAMIN, VITAMIN B-12, (VITAMIN B-12 PO) Take  by mouth. Review of Systems Pertinent items are noted in HPI. Objective: Wt Readings from Last 3 Encounters:  
01/07/19 177 lb 3.2 oz (80.4 kg) 08/29/18 182 lb (82.6 kg) 03/21/18 180 lb (81.6 kg) Temp Readings from Last 3 Encounters:  
01/07/19 97.3 °F (36.3 °C) (Oral) 08/29/18 98 °F (36.7 °C) (Oral) 03/21/18 97.4 °F (36.3 °C) (Oral) BP Readings from Last 3 Encounters:  
01/07/19 126/86  
08/29/18 138/86  
03/21/18 120/78 Pulse Readings from Last 3 Encounters:  
01/07/19 (!) 57  
08/29/18 (!) 52  
03/21/18 64 Visit Vitals /86 (BP 1 Location: Left arm, BP Patient Position: Sitting) Pulse (!) 57 Temp 97.3 °F (36.3 °C) (Oral) Resp 18 Ht 5' 5\" (1.651 m) Wt 177 lb 3.2 oz (80.4 kg) SpO2 97% BMI 29.49 kg/m² General appearance: alert, cooperative, no distress, appears stated age Head: Normocephalic, without obvious abnormality, atraumatic Neck: supple, symmetrical, trachea midline, no adenopathy, no carotid bruit and no JVD Lungs: clear to auscultation bilaterally Heart: regular rate and rhythm, S1, S2 normal, no murmur, click, rub or gallop Extremities: extremities normal, atraumatic, no cyanosis or edema Pulses: 2+ and symmetric Assessment/Plan: 1. Diabetes mellitus type 2, diet-controlled ([address] 
-has lost weight and watching her diet. 
-check labs today. - CBC WITH AUTOMATED DIFF 
- METABOLIC PANEL, COMPREHENSIVE 
- HEMOGLOBIN A1C WITH EAG 
- MICROALBUMIN, UR, RAND W/ MICROALB/CREAT RATIO 2. Benign hypertension 
-I evaluated and recommended to continue current doses of medications.  
- METABOLIC PANEL, COMPREHENSIVE 3. Mixed hyperlipidemia 
-due for fasting lipid panel at this time.  
 
- METABOLIC PANEL, COMPREHENSIVE 
- LIPID PANEL 4. Encounter for long-term (current) use of medications 5. Health Care Maintenance   
-patient refuses shingrix. Orders Placed This Encounter  CBC WITH AUTOMATED DIFF  
 METABOLIC PANEL, COMPREHENSIVE  LIPID PANEL  
 HEMOGLOBIN A1C WITH EAG  
 MICROALBUMIN, UR, RAND W/ MICROALB/CREAT RATIO  losartan-hydroCHLOROthiazide (HYZAAR) 100-12.5 mg per tablet Sig: TAKE 1 TABLET BY MOUTH EVERY DAY Dispense:  90 Tab Refill:  1  
 atorvastatin (LIPITOR) 10 mg tablet Sig: TAKE 1 TABLET BY MOUTH EVERY DAY Dispense:  90 Tab Refill:  1 Follow-up Disposition:  
 
Follow up 4 months  For her diabetes mellitus type 2, hypertension and hyperlipidemia Return if symptoms worsen or fail to improve. Advised patient to call back or return to office if symptoms worsen/change/persist.  
 
Discussed expected course/resolution/complications of diagnosis in detail with patient. Medication risks/benefits/costs/interactions/alternatives discussed with patient. Patient was given an after visit summary which includes diagnoses, current medications, & vitals. Patient expressed understanding with the diagnosis and plan.

## 2019-01-07 NOTE — PROGRESS NOTES
Reviewed record in preparation for visit and have obtained necessary documentation. Identified pt with two pt identifiers(name and ). Health Maintenance Due Topic  Shingrix Vaccine Age 50> (1 of 2)  Influenza Age 5 to Adult Chief Complaint Patient presents with  Hypertension 4 mth f/u  Diabetes 4mth f/u  Cholesterol Problem 4 mth f/u Wt Readings from Last 3 Encounters:  
19 177 lb 3.2 oz (80.4 kg) 18 182 lb (82.6 kg) 18 180 lb (81.6 kg) Temp Readings from Last 3 Encounters:  
18 98 °F (36.7 °C) (Oral) 18 97.4 °F (36.3 °C) (Oral) 10/20/17 97.1 °F (36.2 °C) (Oral) BP Readings from Last 3 Encounters:  
18 138/86  
18 120/78  
10/20/17 128/88 Pulse Readings from Last 3 Encounters:  
18 (!) 52  
18 64  
10/20/17 86 Learning Assessment: 
:  
 
Learning Assessment 2019 PRIMARY LEARNER Patient Patient Patient Patient HIGHEST LEVEL OF EDUCATION - PRIMARY LEARNER  2 YEARS OF COLLEGE - > 4 YEARS OF COLLEGE SOME COLLEGE  
BARRIERS PRIMARY LEARNER NONE - NONE NONE  
CO-LEARNER CAREGIVER - - No No  
PRIMARY LANGUAGE ENGLISH ENGLISH ENGLISH ENGLISH  NEED - - No No  
LEARNER PREFERENCE PRIMARY DEMONSTRATION DEMONSTRATION LISTENING OTHER (COMMENT) LEARNING SPECIAL TOPICS - - no no ANSWERED BY patient  patient patient patient RELATIONSHIP SELF SELF SELF SELF  
ASSESSMENT COMMENT - - - no Depression Screening: 
:  
 
PHQ over the last two weeks 2019 Little interest or pleasure in doing things Not at all Feeling down, depressed, irritable, or hopeless Not at all Total Score PHQ 2 0 Fall Risk Assessment: 
:  
 
Fall Risk Assessment, last 12 mths 2019 Able to walk? Yes Fall in past 12 months? No  
 
 
Abuse Screening: 
:  
 
Abuse Screening Questionnaire 2019 2018 2017 7/10/2014 Do you ever feel afraid of your partner? N N N N Are you in a relationship with someone who physically or mentally threatens you? N N N N Is it safe for you to go home? Jaspal Demarco Coordination of Care Questionnaire: 
:  
 
1) Have you been to an emergency room, urgent care clinic since your last visit? no  
Hospitalized since your last visit? no          
 
2) Have you seen or consulted any other health care providers outside of [address] [place] [address] since your last visit? yes  Dr. Nathan Benjamin 12/201 (Include any pap smears or colon screenings in this section.) 3) Do you have an Advance Directive on file? yes 4) Are you interested in receiving information on Advance Directives? NO Patient is accompanied by self I have received verbal consent from Linda Michelle to discuss any/all medical information while they are present in the room. Visit Vitals /86 (BP 1 Location: Left arm, BP Patient Position: Sitting) Pulse (!) 57 Temp 97.3 °F (36.3 °C) (Oral) Resp 18 Ht 5' 5\" (1.651 m) Wt 177 lb 3.2 oz (80.4 kg) SpO2 97% BMI 29.49 kg/m²

## 2019-01-09 LAB
ALBUMIN SERPL-MCNC: 4.2 G/DL (ref 3.6–4.8)
ALBUMIN/CREAT UR: 14.8 MG/G CREAT (ref 0–30)
ALBUMIN/GLOB SERPL: 1.2 {RATIO} (ref 1.2–2.2)
ALP SERPL-CCNC: 126 IU/L (ref 39–117)
ALT SERPL-CCNC: 25 IU/L (ref 0–32)
AST SERPL-CCNC: 20 IU/L (ref 0–40)
BASOPHILS # BLD AUTO: 0 X10E3/UL (ref 0–0.2)
BASOPHILS NFR BLD AUTO: 0 %
BILIRUB SERPL-MCNC: 1 MG/DL (ref 0–1.2)
BUN SERPL-MCNC: 16 MG/DL (ref 8–27)
BUN/CREAT SERPL: 21 (ref 12–28)
CALCIUM SERPL-MCNC: 10.5 MG/DL (ref 8.7–10.3)
CHLORIDE SERPL-SCNC: 107 MMOL/L (ref 96–106)
CHOLEST SERPL-MCNC: 116 MG/DL (ref 100–199)
CO2 SERPL-SCNC: 20 MMOL/L (ref 20–29)
CREAT SERPL-MCNC: 0.77 MG/DL (ref 0.57–1)
CREAT UR-MCNC: 81.6 MG/DL
EOSINOPHIL # BLD AUTO: 0.3 X10E3/UL (ref 0–0.4)
EOSINOPHIL NFR BLD AUTO: 3 %
ERYTHROCYTE [DISTWIDTH] IN BLOOD BY AUTOMATED COUNT: 15.2 % (ref 12.3–15.4)
EST. AVERAGE GLUCOSE BLD GHB EST-MCNC: 146 MG/DL
GLOBULIN SER CALC-MCNC: 3.4 G/DL (ref 1.5–4.5)
GLUCOSE SERPL-MCNC: 111 MG/DL (ref 65–99)
HBA1C MFR BLD: 6.7 % (ref 4.8–5.6)
HCT VFR BLD AUTO: 43.1 % (ref 34–46.6)
HDLC SERPL-MCNC: 42 MG/DL
HGB BLD-MCNC: 14.2 G/DL (ref 11.1–15.9)
IMM GRANULOCYTES # BLD AUTO: 0 X10E3/UL (ref 0–0.1)
IMM GRANULOCYTES NFR BLD AUTO: 0 %
INTERPRETATION, 910389: NORMAL
LDLC SERPL CALC-MCNC: 59 MG/DL (ref 0–99)
LYMPHOCYTES # BLD AUTO: 3.1 X10E3/UL (ref 0.7–3.1)
LYMPHOCYTES NFR BLD AUTO: 40 %
Lab: NORMAL
MCH RBC QN AUTO: 28.4 PG (ref 26.6–33)
MCHC RBC AUTO-ENTMCNC: 32.9 G/DL (ref 31.5–35.7)
MCV RBC AUTO: 86 FL (ref 79–97)
MICROALBUMIN UR-MCNC: 12.1 UG/ML
MONOCYTES # BLD AUTO: 0.5 X10E3/UL (ref 0.1–0.9)
MONOCYTES NFR BLD AUTO: 6 %
NEUTROPHILS # BLD AUTO: 3.8 X10E3/UL (ref 1.4–7)
NEUTROPHILS NFR BLD AUTO: 51 %
PLATELET # BLD AUTO: 214 X10E3/UL (ref 150–379)
POTASSIUM SERPL-SCNC: 4 MMOL/L (ref 3.5–5.2)
PROT SERPL-MCNC: 7.6 G/DL (ref 6–8.5)
RBC # BLD AUTO: 5 X10E6/UL (ref 3.77–5.28)
SODIUM SERPL-SCNC: 145 MMOL/L (ref 134–144)
TRIGL SERPL-MCNC: 74 MG/DL (ref 0–149)
VLDLC SERPL CALC-MCNC: 15 MG/DL (ref 5–40)
WBC # BLD AUTO: 7.6 X10E3/UL (ref 3.4–10.8)

## 2019-05-22 ENCOUNTER — HOSPITAL ENCOUNTER (OUTPATIENT)
Dept: LAB | Age: 69
Discharge: HOME | End: 2019-05-22
Payer: MEDICARE

## 2019-05-22 ENCOUNTER — OFFICE VISIT (OUTPATIENT)
Dept: INTERNAL MEDICINE CLINIC | Age: 69
End: 2019-05-22

## 2019-05-22 VITALS
TEMPERATURE: 97.5 F | OXYGEN SATURATION: 97 % | WEIGHT: 178 LBS | HEART RATE: 58 BPM | RESPIRATION RATE: 16 BRPM | HEIGHT: 65 IN | SYSTOLIC BLOOD PRESSURE: 120 MMHG | BODY MASS INDEX: 29.66 KG/M2 | DIASTOLIC BLOOD PRESSURE: 70 MMHG

## 2019-05-22 DIAGNOSIS — I10 BENIGN HYPERTENSION: ICD-10-CM

## 2019-05-22 DIAGNOSIS — Z79.899 ENCOUNTER FOR LONG-TERM (CURRENT) USE OF MEDICATIONS: ICD-10-CM

## 2019-05-22 DIAGNOSIS — Z00.00 MEDICARE ANNUAL WELLNESS VISIT, SUBSEQUENT: Primary | ICD-10-CM

## 2019-05-22 DIAGNOSIS — Z13.39 SCREENING FOR ALCOHOLISM: ICD-10-CM

## 2019-05-22 DIAGNOSIS — E78.2 MIXED HYPERLIPIDEMIA: ICD-10-CM

## 2019-05-22 DIAGNOSIS — E11.9 DIABETES MELLITUS TYPE 2, DIET-CONTROLLED (HCC): ICD-10-CM

## 2019-05-22 DIAGNOSIS — Z13.31 SCREENING FOR DEPRESSION: ICD-10-CM

## 2019-05-22 PROCEDURE — 82043 UR ALBUMIN QUANTITATIVE: CPT

## 2019-05-22 PROCEDURE — 80053 COMPREHEN METABOLIC PANEL: CPT

## 2019-05-22 PROCEDURE — 83036 HEMOGLOBIN GLYCOSYLATED A1C: CPT

## 2019-05-22 PROCEDURE — 36415 COLL VENOUS BLD VENIPUNCTURE: CPT

## 2019-05-22 RX ORDER — AMOXICILLIN 500 MG/1
TABLET, FILM COATED ORAL
Refills: 0 | COMMUNITY
Start: 2019-05-15 | End: 2019-09-18

## 2019-05-22 NOTE — PATIENT INSTRUCTIONS
Medicare Wellness Visit, Female The best way to live healthy is to have a lifestyle where you eat a well-balanced diet, exercise regularly, limit alcohol use, and quit all forms of tobacco/nicotine, if applicable. Regular preventive services are another way to keep healthy. Preventive services (vaccines, screening tests, monitoring & exams) can help personalize your care plan, which helps you manage your own care. Screening tests can find health problems at the earliest stages, when they are easiest to treat. [facility] follows the current, evidence-based guidelines published by the [facility] ([facility]) when recommending preventive services for our patients. Because we follow these guidelines, sometimes recommendations change over time as research supports it. (For example, mammograms used to be recommended annually. Even though Medicare will still pay for an annual mammogram, the newer guidelines recommend a mammogram every two years for women of average risk.) Of course, you and your doctor may decide to screen more often for some diseases, based on your risk and your health status. Preventive services for you include: - Medicare offers their members a free annual wellness visit, which is time for you and your primary care provider to discuss and plan for your preventive service needs. Take advantage of this benefit every year! 
-All adults over the age of 72 should receive the recommended pneumonia vaccines. Current USPSTF guidelines recommend a series of two vaccines for the best pneumonia protection.  
-All adults should have a flu vaccine yearly and a tetanus vaccine every 10 years. All adults age 61 and older should receive a shingles vaccine once in their lifetime.   
-A bone mass density test is recommended when a woman turns 65 to screen for osteoporosis. This test is only recommended one time, as a screening. Some providers will use this same test as a disease monitoring tool if you already have osteoporosis. -All adults age 38-68 who are overweight should have a diabetes screening test once every three years.  
-Other screening tests and preventive services for persons with diabetes include: an eye exam to screen for diabetic retinopathy, a kidney function test, a foot exam, and stricter control over your cholesterol.  
-Cardiovascular screening for adults with routine risk involves an electrocardiogram (ECG) at intervals determined by your doctor.  
-Colorectal cancer screenings should be done for adults age 54-65 with no increased risk factors for colorectal cancer. There are a number of acceptable methods of screening for this type of cancer. Each test has its own benefits and drawbacks. Discuss with your doctor what is most appropriate for you during your annual wellness visit. The different tests include: colonoscopy (considered the best screening method), a fecal occult blood test, a fecal DNA test, and sigmoidoscopy. -Breast cancer screenings are recommended every other year for women of normal risk, age 54-69. 
-Cervical cancer screenings for women over age 72 are only recommended with certain risk factors.  
-All adults born between [facility] should be screened once for Hepatitis C. Here is a list of your current Health Maintenance items (your personalized list of preventive services) with a due date: 
Health Maintenance Due Topic Date Due  Shingles Vaccine (1 of 2) 04/19/1999

## 2019-05-22 NOTE — PROGRESS NOTES
Subjective:      Fili Quintanilla is a 78 y.o. female who presents today for follow up of her diet controlled diabetes mellitus type 2, hypertension and hyperlipidemia. She is also here for her medicare wellness exam.     She has refused the shingrix vaccine. Getting dental work - tooth pulled last week and now getting a bridge made. She has been busy traveling with her family. She denies polyuria, polydipsia, nocturia, nausea/vomiting, bowel changes, chest pain, syncope, dyspnea or lightheadedness. Patient Active Problem List   Diagnosis Code    Essential hypertension I10    History of screening mammography Z92.89    Pap smear for cervical cancer screening Z12.4    Screen for colon cancer Z12.11    Diabetes mellitus, type 2 ([facility] [facility] E11.9    History of bone density study Z92.89    Hyperlipidemia E78.5    Diabetic eye exam ([facility] Z01.00, E11.9    Advance directive in chart Z78.9     Current Outpatient Medications   Medication Sig Dispense Refill    amoxicillin 500 mg tab TAKE 1 TABLET BY MOUTH 3 TIMES A DAY  0    losartan-hydroCHLOROthiazide (HYZAAR) 100-12.5 mg per tablet TAKE 1 TABLET BY MOUTH EVERY DAY 90 Tab 1    atorvastatin (LIPITOR) 10 mg tablet TAKE 1 TABLET BY MOUTH EVERY DAY 90 Tab 1    CHOLECALCIFEROL, VITAMIN D3, (VITAMIN D3 PO) Take 2,000 Units by mouth daily.  CYANOCOBALAMIN, VITAMIN B-12, (VITAMIN B-12 PO) Take  by mouth. Review of Systems    A comprehensive review of systems was negative except for that written in the HPI. Objective:      Wt Readings from Last 3 Encounters:   05/22/19 178 lb (80.7 kg)   01/07/19 177 lb 3.2 oz (80.4 kg)   08/29/18 182 lb (82.6 kg)     Temp Readings from Last 3 Encounters:   05/22/19 97.5 °F (36.4 °C) (Oral)   01/07/19 97.3 °F (36.3 °C) (Oral)   08/29/18 98 °F (36.7 °C) (Oral)     BP Readings from Last 3 Encounters:   05/22/19 120/70   01/07/19 126/86   08/29/18 138/86     Pulse Readings from Last 3 Encounters: 05/22/19 (!) 58   01/07/19 (!) 57   08/29/18 (!) 52       Visit Vitals  /70 (BP 1 Location: Left arm, BP Patient Position: Sitting)   Pulse (!) 58   Temp 97.5 °F (36.4 °C) (Oral)   Resp 16   Ht 5' 5\" (1.651 m)   Wt 178 lb (80.7 kg)   SpO2 97%   BMI 29.62 kg/m²     General appearance: alert, cooperative, no distress, appears stated age  Head: Normocephalic, without obvious abnormality, atraumatic  Neck: supple, symmetrical, trachea midline, no adenopathy, no carotid bruit and no JVD  Lungs: clear to auscultation bilaterally  Heart: regular rate and rhythm, S1, S2 normal, no murmur, click, rub or gallop  Abdomen: soft, non-tender. Bowel sounds normal. No masses,  no organomegaly  Extremities: extremities normal, atraumatic, no cyanosis or edema    Diabetic foot exam:     Left Foot:   Visual Exam: normal    Pulse DP: 2+ (normal)   Filament test: normal sensation    Vibratory sensation: normal      Right Foot:   Visual Exam: normal    Pulse DP: 2+ (normal)   Filament test: normal sensation    Vibratory sensation: normal     Assessment/Plan:     1. Diabetes mellitus type 2, diet-controlled ([address]  -encouraged her to maintain diabetes mellitus diet. - METABOLIC PANEL, COMPREHENSIVE  - HEMOGLOBIN A1C WITH EAG  - MICROALBUMIN, UR, RAND W/ MICROALB/CREAT RATIO  -  DIABETES FOOT EXAM    2. Benign hypertension  -I evaluated and recommended to continue current doses of medications.     - METABOLIC PANEL, COMPREHENSIVE    3. Mixed hyperlipidemia  -last fasting lipid panel done 1/7/19 controlled. - METABOLIC PANEL, COMPREHENSIVE    4. Encounter for long-term (current) use of medications    - METABOLIC PANEL, COMPREHENSIVE    5. Medicare annual wellness visit, subsequent  -patient refused shingrix. - OR ANNUAL ALCOHOL SCREEN 15 MIN  - DEPRESSION SCREEN ANNUAL    6.  Screening for alcoholism    - OR ANNUAL ALCOHOL SCREEN 15 MIN    7. Screening for depression  -  - DEPRESSION SCREEN ANNUAL     Orders Placed This Encounter    Depression Screen Annual    METABOLIC PANEL, COMPREHENSIVE    HEMOGLOBIN A1C WITH EAG    MICROALBUMIN, UR, RAND W/ MICROALB/CREAT RATIO     DIABETES FOOT EXAM    Annual  Alcohol Screen 15 min ()    amoxicillin 500 mg tab     Sig: TAKE 1 TABLET BY MOUTH 3 TIMES A DAY     Refill:  0      Follow-up Disposition:     Follow up 4 months      Return if symptoms worsen or fail to improve. Advised patient to call back or return to office if symptoms worsen/change/persist.     Discussed expected course/resolution/complications of diagnosis in detail with patient. Medication risks/benefits/costs/interactions/alternatives discussed with patient. Patient was given an after visit summary which includes diagnoses, current medications, & vitals. Patient expressed understanding with the diagnosis and plan. This is the Subsequent Medicare Annual Wellness Exam, performed 12 months or more after the Initial AWV or the last Subsequent AWV    I have reviewed the patient's medical history in detail and updated the computerized patient record. History     Past Medical History:   Diagnosis Date    Pap smear for cervical cancer screening 3/22/2012      Past Surgical History:   Procedure Laterality Date    HX COLONOSCOPY  02/02/2018    nml per pt    HX WISDOM TEETH EXTRACTION Right 03/12/2018    x1 upper     Current Outpatient Medications   Medication Sig Dispense Refill    amoxicillin 500 mg tab TAKE 1 TABLET BY MOUTH 3 TIMES A DAY  0    losartan-hydroCHLOROthiazide (HYZAAR) 100-12.5 mg per tablet TAKE 1 TABLET BY MOUTH EVERY DAY 90 Tab 1    atorvastatin (LIPITOR) 10 mg tablet TAKE 1 TABLET BY MOUTH EVERY DAY 90 Tab 1    CHOLECALCIFEROL, VITAMIN D3, (VITAMIN D3 PO) Take 2,000 Units by mouth daily.  CYANOCOBALAMIN, VITAMIN B-12, (VITAMIN B-12 PO) Take  by mouth.        Allergies   Allergen Reactions    Avocado Swelling     Tongue swelling    Cocoa Unknown (comments)     Pt states she is not allergic to Cocoa; just Coconut.  Coconut Swelling     Swelling of the tongue     Peanut Swelling     Tongue swelling     Family History   Problem Relation Age of Onset    Breast Cancer Mother         29's     Social History     Tobacco Use    Smoking status: Never Smoker    Smokeless tobacco: Never Used   Substance Use Topics    Alcohol use: No     Patient Active Problem List   Diagnosis Code    Essential hypertension I10    History of screening mammography Z92.89    Pap smear for cervical cancer screening Z12.4    Screen for colon cancer Z12.11    [facility], [facility] E11.9    History of bone density study Z92.89    Hyperlipidemia E78.5    Diabetic eye exam ([facility] Z01.00, E11.9    Advance directive in chart Z78.9       Depression Risk Factor Screening:     3 most recent PHQ Screens 1/7/2019   Little interest or pleasure in doing things Not at all   Feeling down, depressed, irritable, or hopeless Not at all   Total Score PHQ 2 0     Alcohol Risk Factor Screening: You do not drink alcohol or very rarely. Functional Ability and Level of Safety:   Hearing Loss  Hearing is good. Activities of Daily Living  The home contains: handrails and grab bars  Patient does total self care    Fall Risk  Fall Risk Assessment, last 12 mths 5/22/2019   Able to walk? Yes   Fall in past 12 months? No       Abuse Screen  Patient is not abused    Cognitive Screening   Evaluation of Cognitive Function:  Has your family/caregiver stated any concerns about your memory: no  Normal    Patient Care Team   Patient Care Team:  Lavon Peters MD as PCP - General (Internal Medicine)    Assessment/Plan   Education and counseling provided:  Are appropriate based on today's review and evaluation    Diagnoses and all orders for this visit:    1. Diabetes mellitus type 2, diet-controlled ([facility]    2. Benign hypertension    3. Mixed hyperlipidemia    4. Encounter for long-term (current) use of medications    5. Medicare annual wellness visit, subsequent  -     [place] ANNUAL ALCOHOL SCREEN [facility]    6.  Screening for alcoholism  -     [place] ANNUAL ALCOHOL SCREEN 15 MIN    7. Screening for depression  -     [place] Maintenance Due   Topic Date Due    Shingrix Vaccine Age 50> (1 of 2) 04/19/1999

## 2019-05-23 LAB
ALBUMIN SERPL-MCNC: 4.2 G/DL (ref 3.5–4.8)
ALBUMIN/CREAT UR: 10.4 MG/G CREAT (ref 0–30)
ALBUMIN/GLOB SERPL: 1.4 {RATIO} (ref 1.2–2.2)
ALP SERPL-CCNC: 132 IU/L (ref 39–117)
ALT SERPL-CCNC: 23 IU/L (ref 0–32)
AST SERPL-CCNC: 20 IU/L (ref 0–40)
BILIRUB SERPL-MCNC: 0.8 MG/DL (ref 0–1.2)
BUN SERPL-MCNC: 10 MG/DL (ref 8–27)
BUN/CREAT SERPL: 14 (ref 12–28)
CALCIUM SERPL-MCNC: 10.8 MG/DL (ref 8.7–10.3)
CHLORIDE SERPL-SCNC: 107 MMOL/L (ref 96–106)
CO2 SERPL-SCNC: 23 MMOL/L (ref 20–29)
CREAT SERPL-MCNC: 0.71 MG/DL (ref 0.57–1)
CREAT UR-MCNC: 76.7 MG/DL
EST. AVERAGE GLUCOSE BLD GHB EST-MCNC: 143 MG/DL
GLOBULIN SER CALC-MCNC: 3 G/DL (ref 1.5–4.5)
GLUCOSE SERPL-MCNC: 107 MG/DL (ref 65–99)
HBA1C MFR BLD: 6.6 % (ref 4.8–5.6)
MICROALBUMIN UR-MCNC: 8 UG/ML
POTASSIUM SERPL-SCNC: 4.2 MMOL/L (ref 3.5–5.2)
PROT SERPL-MCNC: 7.2 G/DL (ref 6–8.5)
SODIUM SERPL-SCNC: 145 MMOL/L (ref 134–144)

## 2019-09-17 NOTE — PROGRESS NOTES
Subjective:      Seth Hampton is a 78 y.o. female who presents today for follow up of her diet controlled diabetes mellitus , hypertension , and hyperlipidemia. She didn't take her blood pressure medication this morning because she was fasting. She has been feeling well. She denies polyuria, polydipsia, nocturia, nausea/vomiting, bowel changes, chest pain, syncope, dyspnea or lightheadedness. Due for:  -flu    Patient Active Problem List   Diagnosis Code    Essential hypertension I10    History of screening mammography Z92.89    Pap smear for cervical cancer screening Z12.4    Screen for colon cancer Z12.11    [facility], [facility] E11.9    History of bone density study Z92.89    Hyperlipidemia E78.5    [facility] Z01.00, E11.9    Advance directive in chart Z78.9     Current Outpatient Medications   Medication Sig Dispense Refill    losartan-hydroCHLOROthiazide (HYZAAR) 100-12.5 mg per tablet Take 1 Tab by mouth daily. 90 Tab 1    atorvastatin (LIPITOR) 10 mg tablet TAKE 1 TABLET BY MOUTH EVERY DAY 90 Tab 1    CHOLECALCIFEROL, VITAMIN D3, (VITAMIN D3 PO) Take 2,000 Units by mouth daily.  CYANOCOBALAMIN, VITAMIN B-12, (VITAMIN B-12 PO) Take  by mouth daily. Review of Systems    Pertinent items are noted in HPI. Objective:      Wt Readings from Last 3 Encounters:   09/18/19 175 lb (79.4 kg)   05/22/19 178 lb (80.7 kg)   01/07/19 177 lb 3.2 oz (80.4 kg)       Visit Vitals  /90 (BP 1 Location: Left arm, BP Patient Position: Sitting)   Pulse (!) 46   Temp 97.4 °F (36.3 °C) (Oral)   Resp 16   Ht 5' 4.5\" (1.638 m)   Wt 175 lb (79.4 kg)   SpO2 95%   BMI 29.57 kg/m²     General appearance: alert, cooperative, no distress, appears stated age  Head: Normocephalic, without obvious abnormality, atraumatic  Neck: supple, symmetrical, trachea midline, no adenopathy, no carotid bruit and no JVD  Lungs: clear to auscultation bilaterally  Heart: regular rate and rhythm, S1, S2 normal, no murmur, click, rub or gallop  Extremities: extremities normal, atraumatic, no cyanosis or edema  Pulses: 2+ and symmetric    Assessment/Plan:     1. Diabetes mellitus type 2, diet-controlled ([address]  -continue to maintain diabetes mellitus diet. - CBC WITH AUTOMATED DIFF  - METABOLIC PANEL, COMPREHENSIVE  - LIPID PANEL  - HEMOGLOBIN A1C WITH EAG  - MICROALBUMIN, UR, RAND W/ MICROALB/CREAT RATIO    2. Benign hypertension  -no changes for now. Blood pressure slightly elevated this morning due to not taking her med this morning.     - METABOLIC PANEL, COMPREHENSIVE    3. Mixed hyperlipidemia  -due for fasting lipid panel today. - METABOLIC PANEL, COMPREHENSIVE  - LIPID PANEL    4. Encounter for long-term (current) use of medications      5. Encounter for immunization  -she received her flu vaccine today without any complications. - INFLUENZA VACCINE INACTIVATED (IIV), SUBUNIT, ADJUVANTED, IM  - ADMIN INFLUENZA VIRUS VAC     Follow-up Disposition:     Follow up in 6 months     Return if symptoms worsen or fail to improve. Advised patient to call back or return to office if symptoms worsen/change/persist.     Discussed expected course/resolution/complications of diagnosis in detail with patient. Medication risks/benefits/costs/interactions/alternatives discussed with patient. Patient was given an after visit summary which includes diagnoses, current medications, & vitals. Patient expressed understanding with the diagnosis and plan.

## 2019-09-18 ENCOUNTER — OFFICE VISIT (OUTPATIENT)
Dept: INTERNAL MEDICINE CLINIC | Age: 69
End: 2019-09-18

## 2019-09-18 ENCOUNTER — HOSPITAL ENCOUNTER (OUTPATIENT)
Dept: LAB | Age: 69
Discharge: HOME | End: 2019-09-18
Payer: MEDICARE

## 2019-09-18 VITALS
BODY MASS INDEX: 29.16 KG/M2 | HEART RATE: 46 BPM | SYSTOLIC BLOOD PRESSURE: 146 MMHG | WEIGHT: 175 LBS | HEIGHT: 65 IN | OXYGEN SATURATION: 95 % | DIASTOLIC BLOOD PRESSURE: 90 MMHG | RESPIRATION RATE: 16 BRPM | TEMPERATURE: 97.4 F

## 2019-09-18 DIAGNOSIS — E78.2 MIXED HYPERLIPIDEMIA: ICD-10-CM

## 2019-09-18 DIAGNOSIS — E11.9 DIABETES MELLITUS TYPE 2, DIET-CONTROLLED (HCC): Primary | ICD-10-CM

## 2019-09-18 DIAGNOSIS — Z79.899 ENCOUNTER FOR LONG-TERM (CURRENT) USE OF MEDICATIONS: ICD-10-CM

## 2019-09-18 DIAGNOSIS — I10 BENIGN HYPERTENSION: ICD-10-CM

## 2019-09-18 DIAGNOSIS — Z23 ENCOUNTER FOR IMMUNIZATION: ICD-10-CM

## 2019-09-18 PROCEDURE — 82043 UR ALBUMIN QUANTITATIVE: CPT

## 2019-09-18 PROCEDURE — 80061 LIPID PANEL: CPT

## 2019-09-18 PROCEDURE — 80053 COMPREHEN METABOLIC PANEL: CPT

## 2019-09-18 PROCEDURE — 85025 COMPLETE CBC W/AUTO DIFF WBC: CPT

## 2019-09-18 PROCEDURE — 83036 HEMOGLOBIN GLYCOSYLATED A1C: CPT

## 2019-09-18 RX ORDER — LOSARTAN POTASSIUM AND HYDROCHLOROTHIAZIDE 12.5; 1 MG/1; MG/1
1 TABLET ORAL DAILY
Qty: 90 TAB | Refills: 1 | Status: SHIPPED | OUTPATIENT
Start: 2019-09-18 | End: 2019-10-31 | Stop reason: ALTCHOICE

## 2019-09-18 RX ORDER — ATORVASTATIN CALCIUM 10 MG/1
TABLET, FILM COATED ORAL
Qty: 90 TAB | Refills: 1 | Status: SHIPPED | OUTPATIENT
Start: 2019-09-18 | End: 2020-04-02 | Stop reason: SDUPTHER

## 2019-09-18 NOTE — PATIENT INSTRUCTIONS
Vaccine Information Statement    Influenza (Flu) Vaccine (Inactivated or Recombinant): What You Need to Know    Many Vaccine Information Statements are available in [place] and other languages. See www.immunize.org/vis  Hojas de información sobre vacunas están disponibles en español y en muchos otros idiomas. Visite www.immunize.org/vis    1. Why get vaccinated? Influenza vaccine can prevent influenza (flu). Flu is a contagious disease that spreads around the [place] every year, usually between October and May. Anyone can get the flu, but it is more dangerous for some people. Infants and young children, people 72years of age and older, pregnant women, and people with certain health conditions or a weakened immune system are at greatest risk of flu complications. Pneumonia, bronchitis, sinus infections and ear infections are examples of flu-related complications. If you have a medical condition, such as heart disease, cancer or diabetes, flu can make it worse. Flu can cause fever and chills, sore throat, muscle aches, fatigue, cough, headache, and runny or stuffy nose. Some people may have vomiting and diarrhea, though this is more common in children than adults. Each year thousands of people in the [facility] die from flu, and many more are hospitalized. Flu vaccine prevents millions of illnesses and flu-related visits to the doctor each year. 2. Influenza vaccines     CDC recommends everyone 10months of age and older get vaccinated every flu season. Children 6 months through 6years of age may need 2 doses during a single flu season. Everyone else needs only 1 dose each flu season. It takes about 2 weeks for protection to develop after vaccination. There are many flu viruses, and they are always changing. Each year a new flu vaccine is made to protect against three or four viruses that are likely to cause disease in the upcoming flu season.  Even when the vaccine doesnt exactly match these viruses, it may still provide some protection. Influenza vaccine does not cause flu. Influenza vaccine may be given at the same time as other vaccines. 3. Talk with your health care provider    Tell your vaccine provider if the person getting the vaccine:   Has had an allergic reaction after a previous dose of influenza vaccine, or has any severe, life-threatening allergies.  Has ever had Guillain-Barré Syndrome (also called GBS). In some cases, your health care provider may decide to postpone influenza vaccination to a future visit. People with minor illnesses, such as a cold, may be vaccinated. People who are moderately or severely ill should usually wait until they recover before getting influenza vaccine. Your health care provider can give you more information. 4. Risks of a reaction     Soreness, redness, and swelling where shot is given, fever, muscle aches, and headache can happen after influenza vaccine.  There may be a very small increased risk of Guillain-Barré Syndrome (GBS) after inactivated influenza vaccine (the flu shot). [place] children who get the flu shot along with pneumococcal vaccine (PCV13), and/or DTaP vaccine at the same time might be slightly more likely to have a seizure caused by fever. Tell your health care provider if a child who is getting flu vaccine has ever had a seizure. People sometimes faint after medical procedures, including vaccination. Tell your provider if you feel dizzy or have vision changes or ringing in the ears. As with any medicine, there is a very remote chance of a vaccine causing a severe allergic reaction, other serious injury, or death. 5. What if there is a serious problem? An allergic reaction could occur after the vaccinated person leaves the clinic.  If you see signs of a severe allergic reaction (hives, swelling of the face and throat, difficulty breathing, a fast heartbeat, dizziness, or weakness), call 9-1-1 and get the person to the nearest hospital.    For other signs that concern you, call your health care provider. Adverse reactions should be reported to the Vaccine Adverse Event Reporting System (VAERS). Your health care provider will usually file this report, or you can do it yourself. Visit the VAERS website at www.vaers. [facility].gov or call 2-188.688.9485. VAERS is only for reporting reactions, and VAERS staff do not give medical advice. 6. The National Vaccine Injury Compensation Program    The [facility] Vaccine Injury Compensation Program (VICP) is a federal program that was created to compensate people who may have been injured by certain vaccines. Visit the VICP website at www.[facility].gov/vaccinecompensation or call 9-376.795.8101 to learn about the program and about filing a claim. There is a time limit to file a claim for compensation. 7. How can I learn more?  Ask your health care provider.  Call your local or state health department.  Contact the Centers for Disease Control and Prevention (CDC):  - Call 7-972.856.7335 (1-800-CDC-INFO) or  - Visit CDCs influenza website at www.cdc.gov/flu    Vaccine Information Statement (Interim)  Inactivated Influenza Vaccine   8/15/2019  42 NIALL Richardson [id number]   Department of Health and Human Services  Centers for Disease Control and Prevention    Office Use Only

## 2019-09-19 LAB
ALBUMIN SERPL-MCNC: 4.3 G/DL (ref 3.5–4.8)
ALBUMIN/CREAT UR: 10.1 MG/G CREAT (ref 0–30)
ALBUMIN/GLOB SERPL: 1.3 {RATIO} (ref 1.2–2.2)
ALP SERPL-CCNC: 142 IU/L (ref 39–117)
ALT SERPL-CCNC: 19 IU/L (ref 0–32)
AST SERPL-CCNC: 16 IU/L (ref 0–40)
BASOPHILS # BLD AUTO: 0 X10E3/UL (ref 0–0.2)
BASOPHILS NFR BLD AUTO: 0 %
BILIRUB SERPL-MCNC: 0.7 MG/DL (ref 0–1.2)
BUN SERPL-MCNC: 14 MG/DL (ref 8–27)
BUN/CREAT SERPL: 17 (ref 12–28)
CALCIUM SERPL-MCNC: 11 MG/DL (ref 8.7–10.3)
CHLORIDE SERPL-SCNC: 105 MMOL/L (ref 96–106)
CHOLEST SERPL-MCNC: 130 MG/DL (ref 100–199)
CO2 SERPL-SCNC: 23 MMOL/L (ref 20–29)
CREAT SERPL-MCNC: 0.83 MG/DL (ref 0.57–1)
CREAT UR-MCNC: 82.1 MG/DL
EOSINOPHIL # BLD AUTO: 0.3 X10E3/UL (ref 0–0.4)
EOSINOPHIL NFR BLD AUTO: 4 %
ERYTHROCYTE [DISTWIDTH] IN BLOOD BY AUTOMATED COUNT: 15.5 % (ref 12.3–15.4)
EST. AVERAGE GLUCOSE BLD GHB EST-MCNC: 143 MG/DL
GLOBULIN SER CALC-MCNC: 3.2 G/DL (ref 1.5–4.5)
GLUCOSE SERPL-MCNC: 118 MG/DL (ref 65–99)
HBA1C MFR BLD: 6.6 % (ref 4.8–5.6)
HCT VFR BLD AUTO: 43.8 % (ref 34–46.6)
HDLC SERPL-MCNC: 43 MG/DL
HGB BLD-MCNC: 15.1 G/DL (ref 11.1–15.9)
IMM GRANULOCYTES # BLD AUTO: 0 X10E3/UL (ref 0–0.1)
IMM GRANULOCYTES NFR BLD AUTO: 0 %
INTERPRETATION, 910389: NORMAL
LDLC SERPL CALC-MCNC: 71 MG/DL (ref 0–99)
LYMPHOCYTES # BLD AUTO: 3.3 X10E3/UL (ref 0.7–3.1)
LYMPHOCYTES NFR BLD AUTO: 41 %
Lab: NORMAL
MCH RBC QN AUTO: 30.1 PG (ref 26.6–33)
MCHC RBC AUTO-ENTMCNC: 34.5 G/DL (ref 31.5–35.7)
MCV RBC AUTO: 87 FL (ref 79–97)
MICROALBUMIN UR-MCNC: 8.3 UG/ML
MONOCYTES # BLD AUTO: 0.4 X10E3/UL (ref 0.1–0.9)
MONOCYTES NFR BLD AUTO: 5 %
NEUTROPHILS # BLD AUTO: 4 X10E3/UL (ref 1.4–7)
NEUTROPHILS NFR BLD AUTO: 50 %
PLATELET # BLD AUTO: 225 X10E3/UL (ref 150–450)
POTASSIUM SERPL-SCNC: 3.9 MMOL/L (ref 3.5–5.2)
PROT SERPL-MCNC: 7.5 G/DL (ref 6–8.5)
RBC # BLD AUTO: 5.01 X10E6/UL (ref 3.77–5.28)
SODIUM SERPL-SCNC: 142 MMOL/L (ref 134–144)
TRIGL SERPL-MCNC: 80 MG/DL (ref 0–149)
VLDLC SERPL CALC-MCNC: 16 MG/DL (ref 5–40)
WBC # BLD AUTO: 8 X10E3/UL (ref 3.4–10.8)

## 2019-09-23 NOTE — PROGRESS NOTES
Calcium level a little elevated. Will check vitamin D level and PTH at next visit.  Letter sent 9/23/2019

## 2019-10-14 ENCOUNTER — HOSPITAL ENCOUNTER (OUTPATIENT)
Dept: MAMMOGRAPHY | Age: 69
Discharge: HOME | End: 2019-10-14
Attending: INTERNAL MEDICINE
Payer: MEDICARE

## 2019-10-14 DIAGNOSIS — Z12.31 VISIT FOR SCREENING MAMMOGRAM: ICD-10-CM

## 2019-10-14 PROCEDURE — 77067 SCR MAMMO BI INCL CAD: CPT

## 2019-10-31 ENCOUNTER — TELEPHONE (OUTPATIENT)
Dept: INTERNAL MEDICINE CLINIC | Age: 69
End: 2019-10-31

## 2019-10-31 RX ORDER — OLMESARTAN MEDOXOMIL AND HYDROCHLOROTHIAZIDE 20/12.5 20; 12.5 MG/1; MG/1
1 TABLET ORAL DAILY
Qty: 90 TAB | Refills: 0 | Status: SHIPPED | OUTPATIENT
Start: 2019-10-31 | End: 2020-01-22

## 2019-10-31 NOTE — TELEPHONE ENCOUNTER
----- Message from Lazarus Njly sent at 10/31/2019 11:37 AM EDT -----  Regarding: Dr. Africa Melgoza: [phone]  Pt states Larramana Bonilla 100-12.5 mg has been recalled per [facility],  and need something comparable.

## 2019-10-31 NOTE — TELEPHONE ENCOUNTER
Spoke with patient after verifying name and . Patient states she has tried two different [facility] locations and they have both stated the  does not make this medication anymore. Patient is requesting a new medication.     [facility] [place]

## 2020-01-23 ENCOUNTER — TELEPHONE (OUTPATIENT)
Dept: INTERNAL MEDICINE CLINIC | Age: 70
End: 2020-01-23

## 2020-01-23 NOTE — TELEPHONE ENCOUNTER
Returned call to pt - verified self and birth date. Pt questioned if she needed any additional vaccines d/t the coronavirus. She was informed at this time no additional vaccines are needed and encouraged to wash her hands frequently. Pt acknowledged understanding.

## 2020-01-23 NOTE — TELEPHONE ENCOUNTER
----- Message from Serenity Seay sent at 1/23/2020  8:44 AM EST -----  Regarding: /Telephone  Contact: [phone]  Pt requesting a call back in regards to traveling out of town to [place] - pt. is concerned about the virus and would like to talk with the nurse regarding if there are any precautions pt. needs to take . Pt best contact number is [phone] .

## 2020-04-01 ENCOUNTER — PATIENT MESSAGE (OUTPATIENT)
Dept: INTERNAL MEDICINE CLINIC | Age: 70
End: 2020-04-01

## 2020-04-02 ENCOUNTER — VIRTUAL VISIT (OUTPATIENT)
Dept: INTERNAL MEDICINE CLINIC | Age: 70
End: 2020-04-02

## 2020-04-02 VITALS — DIASTOLIC BLOOD PRESSURE: 75 MMHG | SYSTOLIC BLOOD PRESSURE: 125 MMHG | BODY MASS INDEX: 30.59 KG/M2 | WEIGHT: 181 LBS

## 2020-04-02 DIAGNOSIS — E78.2 MIXED HYPERLIPIDEMIA: ICD-10-CM

## 2020-04-02 DIAGNOSIS — E11.9 DIABETES MELLITUS TYPE 2, DIET-CONTROLLED (HCC): Primary | ICD-10-CM

## 2020-04-02 DIAGNOSIS — Z79.899 ENCOUNTER FOR LONG-TERM (CURRENT) USE OF MEDICATIONS: ICD-10-CM

## 2020-04-02 DIAGNOSIS — I10 BENIGN HYPERTENSION: ICD-10-CM

## 2020-04-02 RX ORDER — OLMESARTAN MEDOXOMIL AND HYDROCHLOROTHIAZIDE 20/12.5 20; 12.5 MG/1; MG/1
TABLET ORAL
Qty: 90 TAB | Refills: 1 | Status: SHIPPED | OUTPATIENT
Start: 2020-04-02 | End: 2020-10-04

## 2020-04-02 RX ORDER — ATORVASTATIN CALCIUM 10 MG/1
TABLET, FILM COATED ORAL
Qty: 90 TAB | Refills: 1 | Status: SHIPPED | OUTPATIENT
Start: 2020-04-02 | End: 2020-10-04

## 2020-04-02 NOTE — PROGRESS NOTES
Manpreet Person is a 78 y.o. female evaluated via telephone on 4/2/2020. She was scheduled for Virtual visit. Patient unable to do so via [employer] or [employer]    Consent:  She and/or health care decision maker is aware that that she may receive a bill for this telephone service, depending on her insurance coverage, and has provided verbal consent to proceed: Yes      The following sections were reviewed and/or updated:  Patient Active Problem List   Diagnosis Code    Essential hypertension I10    History of screening mammography Z92.89    Pap smear for cervical cancer screening Z12.4    Screen for colon cancer Z12.11    Diabetes mellitus, type 2 ([facility] [address] E11.9    History of bone density study Z92.89    Hyperlipidemia E78.5    Diabetic eye exam ([facility] Z01.00, E11.9    Advance directive in chart Z78.9     Current Outpatient Medications   Medication Sig Dispense Refill    olmesartan-hydroCHLOROthiazide (BENICAR HCT) 20-12.5 mg per tablet TAKE 1 TABLET BY MOUTH EVERY DAY 90 Tab 1    atorvastatin (LIPITOR) 10 mg tablet TAKE 1 TABLET BY MOUTH EVERY DAY 90 Tab 1    CHOLECALCIFEROL, VITAMIN D3, (VITAMIN D3 PO) Take 2,000 Units by mouth daily.  CYANOCOBALAMIN, VITAMIN B-12, (VITAMIN B-12 PO) Take  by mouth daily. Documentation:  I communicated with the patient and/or health care decision maker regarding: her current medical problems       Details of this discussion including any medical advice provided is documented below. Diagnoses and all orders for this visit:    1. Diabetes mellitus type 2, diet-controlled ([facility]  -she is still maintaining diabetes mellitus diet.    -encouraged regular exercise  -is not checking her glucose regularly.   -she denies polyuria, polydipsia, nocturia, nausea/vomiting, bowel changes, chest pain, syncope, dyspnea or lightheadedness.   -will get labs in the fall  -last eye exam done in the fall with Dr. Sharlee Canavan.      2. Benign hypertension  -she is compliant with use of her benicar/hct. Needs refill.   -blood pressures at home are stable. Systolic normally in the 647'I with diastolic in the 20'C. 3. Mixed hyperlipidemia    4. Encounter for long-term (current) use of medications    5. Health Care Maintenance    -refuses shingrix. Other orders  -     olmesartan-hydroCHLOROthiazide (BENICAR HCT) 20-12.5 mg per tablet; TAKE 1 TABLET BY MOUTH EVERY DAY  -     atorvastatin (LIPITOR) 10 mg tablet; TAKE 1 TABLET BY MOUTH EVERY DAY           I affirm this is a Patient Initiated Episode with an Established Patient who has not had a related appointment within my department in the past 7 days or scheduled within the next 24 hours.     Total Time: minutes: 11-20 minutes    Note: not billable if this call serves to triage the patient into an appointment for the relevant concern      Le Muhammad MD

## 2020-08-10 ENCOUNTER — HOSPITAL ENCOUNTER (OUTPATIENT)
Dept: LAB | Age: 70
Discharge: HOME | End: 2020-08-10
Payer: MEDICARE

## 2020-08-10 ENCOUNTER — OFFICE VISIT (OUTPATIENT)
Dept: INTERNAL MEDICINE CLINIC | Age: 70
End: 2020-08-10
Payer: MEDICARE

## 2020-08-10 VITALS
SYSTOLIC BLOOD PRESSURE: 120 MMHG | RESPIRATION RATE: 16 BRPM | DIASTOLIC BLOOD PRESSURE: 75 MMHG | TEMPERATURE: 97.1 F | HEART RATE: 60 BPM | OXYGEN SATURATION: 97 % | BODY MASS INDEX: 30.93 KG/M2 | WEIGHT: 183 LBS

## 2020-08-10 DIAGNOSIS — Z79.899 ENCOUNTER FOR LONG-TERM (CURRENT) USE OF MEDICATIONS: ICD-10-CM

## 2020-08-10 DIAGNOSIS — E11.9 DIABETES MELLITUS TYPE 2, DIET-CONTROLLED (HCC): ICD-10-CM

## 2020-08-10 DIAGNOSIS — Z13.39 SCREENING FOR ALCOHOLISM: ICD-10-CM

## 2020-08-10 DIAGNOSIS — Z00.00 MEDICARE ANNUAL WELLNESS VISIT, SUBSEQUENT: Primary | ICD-10-CM

## 2020-08-10 DIAGNOSIS — E78.2 MIXED HYPERLIPIDEMIA: ICD-10-CM

## 2020-08-10 DIAGNOSIS — I10 BENIGN HYPERTENSION: ICD-10-CM

## 2020-08-10 DIAGNOSIS — Z13.31 SCREENING FOR DEPRESSION: ICD-10-CM

## 2020-08-10 PROCEDURE — 1101F PT FALLS ASSESS-DOCD LE1/YR: CPT | Performed by: INTERNAL MEDICINE

## 2020-08-10 PROCEDURE — G9899 SCRN MAM PERF RSLTS DOC: HCPCS | Performed by: INTERNAL MEDICINE

## 2020-08-10 PROCEDURE — 1090F PRES/ABSN URINE INCON ASSESS: CPT | Performed by: INTERNAL MEDICINE

## 2020-08-10 PROCEDURE — G0444 DEPRESSION SCREEN ANNUAL: HCPCS | Performed by: INTERNAL MEDICINE

## 2020-08-10 PROCEDURE — 3017F COLORECTAL CA SCREEN DOC REV: CPT | Performed by: INTERNAL MEDICINE

## 2020-08-10 PROCEDURE — G8752 SYS BP LESS 140: HCPCS | Performed by: INTERNAL MEDICINE

## 2020-08-10 PROCEDURE — G0439 PPPS, SUBSEQ VISIT: HCPCS | Performed by: INTERNAL MEDICINE

## 2020-08-10 PROCEDURE — G8536 NO DOC ELDER MAL SCRN: HCPCS | Performed by: INTERNAL MEDICINE

## 2020-08-10 PROCEDURE — 2022F DILAT RTA XM EVC RTNOPTHY: CPT | Performed by: INTERNAL MEDICINE

## 2020-08-10 PROCEDURE — G8510 SCR DEP NEG, NO PLAN REQD: HCPCS | Performed by: INTERNAL MEDICINE

## 2020-08-10 PROCEDURE — 80061 LIPID PANEL: CPT

## 2020-08-10 PROCEDURE — G8427 DOCREV CUR MEDS BY ELIG CLIN: HCPCS | Performed by: INTERNAL MEDICINE

## 2020-08-10 PROCEDURE — 99213 OFFICE O/P EST LOW 20 MIN: CPT | Performed by: INTERNAL MEDICINE

## 2020-08-10 PROCEDURE — G8754 DIAS BP LESS 90: HCPCS | Performed by: INTERNAL MEDICINE

## 2020-08-10 PROCEDURE — 85025 COMPLETE CBC W/AUTO DIFF WBC: CPT

## 2020-08-10 PROCEDURE — 80053 COMPREHEN METABOLIC PANEL: CPT

## 2020-08-10 PROCEDURE — 83036 HEMOGLOBIN GLYCOSYLATED A1C: CPT

## 2020-08-10 PROCEDURE — G8417 CALC BMI ABV UP PARAM F/U: HCPCS | Performed by: INTERNAL MEDICINE

## 2020-08-10 PROCEDURE — 36415 COLL VENOUS BLD VENIPUNCTURE: CPT

## 2020-08-10 PROCEDURE — 82043 UR ALBUMIN QUANTITATIVE: CPT

## 2020-08-10 PROCEDURE — 3051F HG A1C>EQUAL 7.0%<8.0%: CPT | Performed by: INTERNAL MEDICINE

## 2020-08-10 PROCEDURE — G8399 PT W/DXA RESULTS DOCUMENT: HCPCS | Performed by: INTERNAL MEDICINE

## 2020-08-10 NOTE — PROGRESS NOTES
Verified name and birth date for privacy precautions. Chart reviewed in preparation for today's visit. Chief Complaint   Patient presents with    Cholesterol Problem          Health Maintenance Due   Topic    Shingrix Vaccine Age 49> (1 of 2)    Medicare Yearly Exam     Foot Exam Q1     Influenza Age 5 to Adult          Wt Readings from Last 3 Encounters:   08/10/20 183 lb (83 kg)   04/02/20 181 lb (82.1 kg)   09/18/19 175 lb (79.4 kg)     Temp Readings from Last 3 Encounters:   08/10/20 97.1 °F (36.2 °C) (Temporal)   09/18/19 97.4 °F (36.3 °C) (Oral)   05/22/19 97.5 °F (36.4 °C) (Oral)     BP Readings from Last 3 Encounters:   08/10/20 150/90   04/02/20 125/75   09/18/19 146/90     Pulse Readings from Last 3 Encounters:   08/10/20 60   09/18/19 (!) 46   05/22/19 (!) 58         Learning Assessment:  :     Learning Assessment 1/7/2019 6/23/2017 5/13/2015 1/9/2014   PRIMARY LEARNER Patient Patient Patient Patient   HIGHEST LEVEL OF EDUCATION - PRIMARY LEARNER  2 YEARS OF COLLEGE - > 4 YEARS OF COLLEGE SOME COLLEGE   BARRIERS PRIMARY LEARNER NONE - NONE NONE   CO-LEARNER CAREGIVER - - No No   PRIMARY LANGUAGE ENGLISH ENGLISH ENGLISH ENGLISH    NEED - - No No   LEARNER PREFERENCE PRIMARY DEMONSTRATION DEMONSTRATION LISTENING OTHER (COMMENT)   LEARNING SPECIAL TOPICS - - no no   ANSWERED BY patient  patient patient patient   RELATIONSHIP SELF SELF SELF SELF   ASSESSMENT COMMENT - - - no       Depression Screening:  :     3 most recent PHQ Screens 8/10/2020   Little interest or pleasure in doing things Not at all   Feeling down, depressed, irritable, or hopeless Not at all   Total Score PHQ 2 0       Fall Risk Assessment:  :     Fall Risk Assessment, last 12 mths 8/10/2020   Able to walk? Yes   Fall in past 12 months? No       Abuse Screening:  :     Abuse Screening Questionnaire 8/10/2020 1/7/2019 8/29/2018 6/23/2017 7/10/2014   Do you ever feel afraid of your partner?  N N N N N   Are you in a relationship with someone who physically or mentally threatens you? N N N N N   Is it safe for you to go home?  Cecilia Leo

## 2020-08-10 NOTE — PATIENT INSTRUCTIONS
Medicare Wellness Visit, Female The best way to live healthy is to have a lifestyle where you eat a well-balanced diet, exercise regularly, limit alcohol use, and quit all forms of tobacco/nicotine, if applicable. Regular preventive services are another way to keep healthy. Preventive services (vaccines, screening tests, monitoring & exams) can help personalize your care plan, which helps you manage your own care. Screening tests can find health problems at the earliest stages, when they are easiest to treat. [facility] follows the current, evidence-based guidelines published by the [facility] ([facility]) when recommending preventive services for our patients. Because we follow these guidelines, sometimes recommendations change over time as research supports it. (For example, mammograms used to be recommended annually. Even though Medicare will still pay for an annual mammogram, the newer guidelines recommend a mammogram every two years for women of average risk). Of course, you and your doctor may decide to screen more often for some diseases, based on your risk and your co-morbidities (chronic disease you are already diagnosed with). Preventive services for you include: - Medicare offers their members a free annual wellness visit, which is time for you and your primary care provider to discuss and plan for your preventive service needs. Take advantage of this benefit every year! 
-All adults over the age of 72 should receive the recommended pneumonia vaccines. Current USPSTF guidelines recommend a series of two vaccines for the best pneumonia protection.  
-All adults should have a flu vaccine yearly and a tetanus vaccine every 10 years.  
-All adults age 48 and older should receive the shingles vaccines (series of two vaccines). -All adults age 38-68 who are overweight should have a diabetes screening test once every three years. -All adults born between [birth year] and 1966 should be screened once for Hepatitis C. 
-Other screening tests and preventive services for persons with diabetes include: an eye exam to screen for diabetic retinopathy, a kidney function test, a foot exam, and stricter control over your cholesterol.  
-Cardiovascular screening for adults with routine risk involves an electrocardiogram (ECG) at intervals determined by your doctor.  
-Colorectal cancer screenings should be done for adults age 54-65 with no increased risk factors for colorectal cancer. There are a number of acceptable methods of screening for this type of cancer. Each test has its own benefits and drawbacks. Discuss with your doctor what is most appropriate for you during your annual wellness visit. The different tests include: colonoscopy (considered the best screening method), a fecal occult blood test, a fecal DNA test, and sigmoidoscopy. 
 
-A bone mass density test is recommended when a woman turns 65 to screen for osteoporosis. This test is only recommended one time, as a screening. Some providers will use this same test as a disease monitoring tool if you already have osteoporosis. -Breast cancer screenings are recommended every other year for women of normal risk, age 54-69. 
-Cervical cancer screenings for women over age 72 are only recommended with certain risk factors.

## 2020-08-10 NOTE — PROGRESS NOTES
Subjective:      Stacie Avelar is a 69 y.o. female who presents today for follow up of her diet controlled diabetes mellitus type 2, hypertension and hyperlipidemia. She is due for her Medicare Wellness Exam.     Having her wisdom teeth extracted next week. No new concerns. She denies polyuria, polydipsia, nocturia, nausea/vomiting, bowel changes, chest pain, syncope, dyspnea or lightheadedness. Patient Active Problem List   Diagnosis Code    Essential hypertension I10    History of screening mammography Z92.89    Pap smear for cervical cancer screening Z12.4    Screen for colon cancer Z12.11    Diabetes mellitus, type 2 ([facility] [address] E11.9    History of bone density study Z92.89    Hyperlipidemia E78.5    Routine eye exam Z01.00    Advance directive in chart Z78.9     Current Outpatient Medications   Medication Sig Dispense Refill    olmesartan-hydroCHLOROthiazide (BENICAR HCT) 20-12.5 mg per tablet TAKE 1 TABLET BY MOUTH EVERY DAY 90 Tab 1    atorvastatin (LIPITOR) 10 mg tablet TAKE 1 TABLET BY MOUTH EVERY DAY 90 Tab 1    CHOLECALCIFEROL, VITAMIN D3, (VITAMIN D3 PO) Take 2,000 Units by mouth daily.  CYANOCOBALAMIN, VITAMIN B-12, (VITAMIN B-12 PO) Take  by mouth daily. Review of Systems    Pertinent items are noted in HPI. Objective:      Wt Readings from Last 3 Encounters:   08/10/20 183 lb (83 kg)   04/02/20 181 lb (82.1 kg)   09/18/19 175 lb (79.4 kg)     BP Readings from Last 3 Encounters:   08/10/20 150/90 recgecj 120/75   04/02/20 125/75   09/18/19 146/90     Visit Vitals  /75   Pulse 60   Temp 97.1 °F (36.2 °C) (Temporal)   Resp 16   Wt 183 lb (83 kg)   SpO2 97%   BMI 30.93 kg/m²     General appearance: alert, cooperative, no distress, appears stated age  Head: Normocephalic, without obvious abnormality, atraumatic  Neck: supple, symmetrical, trachea midline, no adenopathy, no carotid bruit and no JVD  Lungs: clear to auscultation bilaterally  Heart: regular rate and rhythm, S1, S2 normal, no murmur, click, rub or gallop  Abdomen: soft, non-tender. Bowel sounds normal. No masses,  no organomegaly  Extremities: extremities normal, atraumatic, no cyanosis or edema    Diabetic foot exam:     Left Foot:   Visual Exam: normal    Pulse DP: 2+ (normal)   Filament test: normal sensation    Vibratory sensation: normal      Right Foot:   Visual Exam: normal    Pulse DP: 2+ (normal)   Filament test: normal sensation    Vibratory sensation: normal     Assessment/Plan:     1. Diabetes mellitus type 2, diet-controlled ([address]  -check labs at this time. -patient to check her glucose at home. -  DIABETES FOOT EXAM  - CBC WITH AUTOMATED DIFF  - METABOLIC PANEL, COMPREHENSIVE  - HEMOGLOBIN A1C WITH EAG  - MICROALBUMIN, UR, RAND W/ MICROALB/CREAT RATIO    2. Benign hypertension  -I evaluated and recommended to continue current doses of medications. - CBC WITH AUTOMATED DIFF  - METABOLIC PANEL, COMPREHENSIVE    3. Mixed hyperlipidemia  -due for fasting lipid panel today. - CBC WITH AUTOMATED DIFF  - METABOLIC PANEL, COMPREHENSIVE  - LIPID PANEL    4. Encounter for long-term (current) use of medications      5. Medicare annual wellness visit, subsequent  -completed today. - DEPRESSION SCREEN ANNUAL    6. Screening for alcoholism      7. Screening for depression    - DEPRESSION SCREEN ANNUAL     Follow-up Disposition:     Follow up in 6 months     Return if symptoms worsen or fail to improve. Advised patient to call back or return to office if symptoms worsen/change/persist.     Discussed expected course/resolution/complications of diagnosis in detail with patient. Medication risks/benefits/costs/interactions/alternatives discussed with patient. Patient was given an after visit summary which includes diagnoses, current medications, & vitals. Patient expressed understanding with the diagnosis and plan.     This is the Subsequent Medicare Annual Wellness Exam, performed 12 months or more after the Initial AWV or the last Subsequent AWV    I have reviewed the patient's medical history in detail and updated the computerized patient record. History     Patient Active Problem List   Diagnosis Code    Essential hypertension I10    History of screening mammography Z92.89    Pap smear for cervical cancer screening Z12.4    Screen for colon cancer Z12.11    Diabetes mellitus, type 2 ([facility] [address] E11.9    History of bone density study Z92.89    Hyperlipidemia E78.5    Routine eye exam Z01.00    Advance directive in chart Z78.9     Past Medical History:   Diagnosis Date    Pap smear for cervical cancer screening 3/22/2012      Past Surgical History:   Procedure Laterality Date    HX COLONOSCOPY  02/02/2018    nml per pt    HX WISDOM TEETH EXTRACTION Right 03/12/2018    x1 upper     Current Outpatient Medications   Medication Sig Dispense Refill    olmesartan-hydroCHLOROthiazide (BENICAR HCT) 20-12.5 mg per tablet TAKE 1 TABLET BY MOUTH EVERY DAY 90 Tab 1    atorvastatin (LIPITOR) 10 mg tablet TAKE 1 TABLET BY MOUTH EVERY DAY 90 Tab 1    CHOLECALCIFEROL, VITAMIN D3, (VITAMIN D3 PO) Take 2,000 Units by mouth daily.  CYANOCOBALAMIN, VITAMIN B-12, (VITAMIN B-12 PO) Take  by mouth daily. Allergies   Allergen Reactions    Avocado Swelling     Tongue swelling    Cocoa Unknown (comments)     Pt states she is not allergic to Cocoa; just Coconut.      Coconut Swelling     Swelling of the tongue     Peanut Swelling     Tongue swelling       Family History   Problem Relation Age of Onset    Breast Cancer Mother         29's     Social History     Tobacco Use    Smoking status: Never Smoker    Smokeless tobacco: Never Used   Substance Use Topics    Alcohol use: No       Depression Risk Factor Screening:     3 most recent PHQ Screens 8/10/2020   Little interest or pleasure in doing things Not at all   Feeling down, depressed, irritable, or hopeless Not at all Total Score PHQ 2 0       Alcohol Risk Factor Screening:   Do you average 1 drink per night or more than 7 drinks a week:  No    On any one occasion in the past three months have you have had more than 3 drinks containing alcohol:  No      Functional Ability and Level of Safety:   Hearing: Hearing is good. Activities of Daily Living: The home contains: no safety equipment. Patient does total self care     Ambulation: with no difficulty     Fall Risk:  Fall Risk Assessment, last 12 mths 8/10/2020   Able to walk? Yes   Fall in past 12 months? No     Abuse Screen:  Patient is not abused       Cognitive Screening   Has your family/caregiver stated any concerns about your memory: no         Patient Care Team   Patient Care Team:  Jesus Alberto Ignacio MD as PCP - General (Internal Medicine)  Jesus Alberto Ignacio MD as PCP - [facility] Empaneled Provider    Assessment/Plan   Education and counseling provided:  Are appropriate based on today's review and evaluation    Diagnoses and all orders for this visit:    1. Medicare annual wellness visit, subsequent  -     [address]    2. Diabetes mellitus type 2, diet-controlled ([facility] [address]    3. Benign hypertension    4. Mixed hyperlipidemia    5. Encounter for long-term (current) use of medications    6. Screening for alcoholism    7.  Screening for depression  -     [address]

## 2020-08-11 LAB
ALBUMIN SERPL-MCNC: 4.5 G/DL (ref 3.7–4.7)
ALBUMIN/CREAT UR: 13 MG/G CREAT (ref 0–29)
ALBUMIN/GLOB SERPL: 1.6 {RATIO} (ref 1.2–2.2)
ALP SERPL-CCNC: 126 IU/L (ref 39–117)
ALT SERPL-CCNC: 22 IU/L (ref 0–32)
AST SERPL-CCNC: 22 IU/L (ref 0–40)
BASOPHILS # BLD AUTO: 0 X10E3/UL (ref 0–0.2)
BASOPHILS NFR BLD AUTO: 1 %
BILIRUB SERPL-MCNC: 1 MG/DL (ref 0–1.2)
BUN SERPL-MCNC: 11 MG/DL (ref 8–27)
BUN/CREAT SERPL: 14 (ref 12–28)
CALCIUM SERPL-MCNC: 11 MG/DL (ref 8.7–10.3)
CHLORIDE SERPL-SCNC: 105 MMOL/L (ref 96–106)
CHOLEST SERPL-MCNC: 126 MG/DL (ref 100–199)
CO2 SERPL-SCNC: 23 MMOL/L (ref 20–29)
CREAT SERPL-MCNC: 0.79 MG/DL (ref 0.57–1)
CREAT UR-MCNC: 54.1 MG/DL
EOSINOPHIL # BLD AUTO: 0.2 X10E3/UL (ref 0–0.4)
EOSINOPHIL NFR BLD AUTO: 3 %
ERYTHROCYTE [DISTWIDTH] IN BLOOD BY AUTOMATED COUNT: 15 % (ref 11.7–15.4)
EST. AVERAGE GLUCOSE BLD GHB EST-MCNC: 154 MG/DL
GLOBULIN SER CALC-MCNC: 2.9 G/DL (ref 1.5–4.5)
GLUCOSE SERPL-MCNC: 118 MG/DL (ref 65–99)
HBA1C MFR BLD: 7 % (ref 4.8–5.6)
HCT VFR BLD AUTO: 45.6 % (ref 34–46.6)
HDLC SERPL-MCNC: 43 MG/DL
HGB BLD-MCNC: 14.6 G/DL (ref 11.1–15.9)
IMM GRANULOCYTES # BLD AUTO: 0 X10E3/UL (ref 0–0.1)
IMM GRANULOCYTES NFR BLD AUTO: 0 %
LDLC SERPL CALC-MCNC: 63 MG/DL (ref 0–99)
LYMPHOCYTES # BLD AUTO: 3.4 X10E3/UL (ref 0.7–3.1)
LYMPHOCYTES NFR BLD AUTO: 45 %
MCH RBC QN AUTO: 28.7 PG (ref 26.6–33)
MCHC RBC AUTO-ENTMCNC: 32 G/DL (ref 31.5–35.7)
MCV RBC AUTO: 90 FL (ref 79–97)
MICROALBUMIN UR-MCNC: 6.9 UG/ML
MONOCYTES # BLD AUTO: 0.4 X10E3/UL (ref 0.1–0.9)
MONOCYTES NFR BLD AUTO: 5 %
NEUTROPHILS # BLD AUTO: 3.5 X10E3/UL (ref 1.4–7)
NEUTROPHILS NFR BLD AUTO: 46 %
PLATELET # BLD AUTO: 215 X10E3/UL (ref 150–450)
POTASSIUM SERPL-SCNC: 3.9 MMOL/L (ref 3.5–5.2)
PROT SERPL-MCNC: 7.4 G/DL (ref 6–8.5)
RBC # BLD AUTO: 5.09 X10E6/UL (ref 3.77–5.28)
SODIUM SERPL-SCNC: 142 MMOL/L (ref 134–144)
TRIGL SERPL-MCNC: 100 MG/DL (ref 0–149)
VLDLC SERPL CALC-MCNC: 20 MG/DL (ref 5–40)
WBC # BLD AUTO: 7.5 X10E3/UL (ref 3.4–10.8)

## 2020-10-04 RX ORDER — OLMESARTAN MEDOXOMIL AND HYDROCHLOROTHIAZIDE 20/12.5 20; 12.5 MG/1; MG/1
TABLET ORAL
Qty: 90 TAB | Refills: 1 | Status: SHIPPED | OUTPATIENT
Start: 2020-10-04 | End: 2021-03-31

## 2020-10-04 RX ORDER — ATORVASTATIN CALCIUM 10 MG/1
TABLET, FILM COATED ORAL
Qty: 90 TAB | Refills: 1 | Status: SHIPPED | OUTPATIENT
Start: 2020-10-04 | End: 2021-03-31

## 2020-10-14 ENCOUNTER — TRANSCRIBE ORDER (OUTPATIENT)
Dept: REGISTRATION | Age: 70
End: 2020-10-14

## 2020-10-14 ENCOUNTER — HOSPITAL ENCOUNTER (OUTPATIENT)
Dept: MAMMOGRAPHY | Age: 70
Discharge: HOME | End: 2020-10-14
Attending: INTERNAL MEDICINE
Payer: MEDICARE

## 2020-10-14 DIAGNOSIS — Z12.31 VISIT FOR SCREENING MAMMOGRAM: Primary | ICD-10-CM

## 2020-10-14 DIAGNOSIS — Z12.31 VISIT FOR SCREENING MAMMOGRAM: ICD-10-CM

## 2020-10-14 PROCEDURE — 77067 SCR MAMMO BI INCL CAD: CPT

## 2021-03-09 ENCOUNTER — TELEPHONE (OUTPATIENT)
Dept: INTERNAL MEDICINE CLINIC | Age: 71
End: 2021-03-09

## 2021-03-17 ENCOUNTER — OFFICE VISIT (OUTPATIENT)
Dept: INTERNAL MEDICINE CLINIC | Age: 71
End: 2021-03-17
Payer: MEDICARE

## 2021-03-17 VITALS
RESPIRATION RATE: 16 BRPM | OXYGEN SATURATION: 95 % | SYSTOLIC BLOOD PRESSURE: 139 MMHG | HEART RATE: 64 BPM | WEIGHT: 179.8 LBS | TEMPERATURE: 97.2 F | BODY MASS INDEX: 29.96 KG/M2 | DIASTOLIC BLOOD PRESSURE: 68 MMHG | HEIGHT: 65 IN

## 2021-03-17 DIAGNOSIS — E11.9 DIABETES MELLITUS TYPE 2, DIET-CONTROLLED (HCC): Primary | ICD-10-CM

## 2021-03-17 DIAGNOSIS — Z79.899 ENCOUNTER FOR LONG-TERM (CURRENT) USE OF MEDICATIONS: ICD-10-CM

## 2021-03-17 DIAGNOSIS — I10 BENIGN HYPERTENSION: ICD-10-CM

## 2021-03-17 DIAGNOSIS — E78.2 MIXED HYPERLIPIDEMIA: ICD-10-CM

## 2021-03-17 PROCEDURE — G9899 SCRN MAM PERF RSLTS DOC: HCPCS | Performed by: INTERNAL MEDICINE

## 2021-03-17 PROCEDURE — G0463 HOSPITAL OUTPT CLINIC VISIT: HCPCS | Performed by: INTERNAL MEDICINE

## 2021-03-17 PROCEDURE — 1090F PRES/ABSN URINE INCON ASSESS: CPT | Performed by: INTERNAL MEDICINE

## 2021-03-17 PROCEDURE — 1101F PT FALLS ASSESS-DOCD LE1/YR: CPT | Performed by: INTERNAL MEDICINE

## 2021-03-17 PROCEDURE — G8752 SYS BP LESS 140: HCPCS | Performed by: INTERNAL MEDICINE

## 2021-03-17 PROCEDURE — G8510 SCR DEP NEG, NO PLAN REQD: HCPCS | Performed by: INTERNAL MEDICINE

## 2021-03-17 PROCEDURE — G8427 DOCREV CUR MEDS BY ELIG CLIN: HCPCS | Performed by: INTERNAL MEDICINE

## 2021-03-17 PROCEDURE — G8754 DIAS BP LESS 90: HCPCS | Performed by: INTERNAL MEDICINE

## 2021-03-17 PROCEDURE — G8536 NO DOC ELDER MAL SCRN: HCPCS | Performed by: INTERNAL MEDICINE

## 2021-03-17 PROCEDURE — 99214 OFFICE O/P EST MOD 30 MIN: CPT | Performed by: INTERNAL MEDICINE

## 2021-03-17 PROCEDURE — G8417 CALC BMI ABV UP PARAM F/U: HCPCS | Performed by: INTERNAL MEDICINE

## 2021-03-17 PROCEDURE — 3017F COLORECTAL CA SCREEN DOC REV: CPT | Performed by: INTERNAL MEDICINE

## 2021-03-17 PROCEDURE — G8399 PT W/DXA RESULTS DOCUMENT: HCPCS | Performed by: INTERNAL MEDICINE

## 2021-03-17 PROCEDURE — 3046F HEMOGLOBIN A1C LEVEL >9.0%: CPT | Performed by: INTERNAL MEDICINE

## 2021-03-17 PROCEDURE — 2022F DILAT RTA XM EVC RTNOPTHY: CPT | Performed by: INTERNAL MEDICINE

## 2021-03-17 NOTE — PROGRESS NOTES
Subjective:      Yaidel Squires is a 69 y.o. female who presents today for follow up of her hypertension, hyperlipidemia and diabetes mellitus type 2 diet controlled. No new concerns today. Doesn't check glucose at home. She denies polyuria, polydipsia, nocturia, nausea/vomiting, bowel changes, chest pain, syncope, dyspnea or lightheadedness. No regular exercise. Has been watching her diet. Current Outpatient Medications   Medication Sig Dispense Refill    olmesartan-hydroCHLOROthiazide (BENICAR HCT) 20-12.5 mg per tablet TAKE 1 TABLET BY MOUTH EVERY DAY 90 Tab 1    atorvastatin (LIPITOR) 10 mg tablet TAKE 1 TABLET BY MOUTH EVERY DAY 90 Tab 1    CHOLECALCIFEROL, VITAMIN D3, (VITAMIN D3 PO) Take 2,000 Units by mouth daily.  CYANOCOBALAMIN, VITAMIN B-12, (VITAMIN B-12 PO) Take  by mouth daily. Review of Systems    Pertinent items are noted in HPI. Objective: Wt Readings from Last 3 Encounters:   03/17/21 179 lb 12.8 oz (81.6 kg)   08/10/20 183 lb (83 kg)   04/02/20 181 lb (82.1 kg)     BP Readings from Last 3 Encounters:   03/17/21 139/68   08/10/20 120/75   04/02/20 125/75     Visit Vitals  /68   Pulse 64   Temp 97.2 °F (36.2 °C) (Temporal)   Resp 16   Ht 5' 5\" (1.651 m)   Wt 179 lb 12.8 oz (81.6 kg)   SpO2 95%   BMI 29.92 kg/m²     General appearance: alert, cooperative, no distress, appears stated age  Head: Normocephalic, without obvious abnormality, atraumatic  Neck: supple, symmetrical, trachea midline, no adenopathy, no carotid bruit and no JVD  Lungs: clear to auscultation bilaterally  Heart: regular rate and rhythm, S1, S2 normal, no murmur, click, rub or gallop  Abdomen: soft, non-tender. Bowel sounds normal. No masses,  no organomegaly  Extremities: extremities normal, atraumatic, no cyanosis or edema  Pulses: 2+ and symmetric    Assessment/Plan:     1. Benign hypertension  -I evaluated and recommended to continue current doses of medications.      2. Diabetes mellitus type 2, diet-controlled ([facility] [address]  -continue diabetes mellitus diet.  -check labs today.   -encouraged regular exercise    3. Mixed hyperlipidemia  -last fasting lipid panel done 8/2020 . Reviewed. Well controlled on current dose of atorvastatin    4. Encounter for long-term (current) use of medications    Orders Placed This Encounter    CBC WITH AUTOMATED DIFF     Standing Status:   Future     Standing Expiration Date:   1/10/9079    METABOLIC PANEL, COMPREHENSIVE     Standing Status:   Future     Standing Expiration Date:   3/17/2022    HEMOGLOBIN A1C WITH EAG     Standing Status:   Future     Standing Expiration Date:   3/17/2022    MICROALBUMIN, UR, RAND W/ MICROALB/CREAT RATIO     Standing Status:   Future     Standing Expiration Date:   3/17/2022         Follow-up Disposition:     Follow up in 6 months     Return if symptoms worsen or fail to improve. Advised patient to call back or return to office if symptoms worsen/change/persist.     Discussed expected course/resolution/complications of diagnosis in detail with patient. Medication risks/benefits/costs/interactions/alternatives discussed with patient. Patient was given an after visit summary which includes diagnoses, current medications, & vitals. Patient expressed understanding with the diagnosis and plan.

## 2021-03-18 LAB
ALBUMIN SERPL-MCNC: 3.9 G/DL (ref 3.5–5)
ALBUMIN/GLOB SERPL: 1 {RATIO} (ref 1.1–2.2)
ALP SERPL-CCNC: 142 U/L (ref 45–117)
ALT SERPL-CCNC: 39 U/L (ref 12–78)
ANION GAP SERPL CALC-SCNC: 6 MMOL/L (ref 5–15)
AST SERPL-CCNC: 19 U/L (ref 15–37)
BASOPHILS # BLD: 0 K/UL (ref 0–0.1)
BASOPHILS NFR BLD: 1 % (ref 0–1)
BILIRUB SERPL-MCNC: 0.9 MG/DL (ref 0.2–1)
BUN SERPL-MCNC: 17 MG/DL (ref 6–20)
BUN/CREAT SERPL: 23 (ref 12–20)
CALCIUM SERPL-MCNC: 10.8 MG/DL (ref 8.5–10.1)
CHLORIDE SERPL-SCNC: 109 MMOL/L (ref 97–108)
CO2 SERPL-SCNC: 27 MMOL/L (ref 21–32)
CREAT SERPL-MCNC: 0.74 MG/DL (ref 0.55–1.02)
CREAT UR-MCNC: 91 MG/DL
DIFFERENTIAL METHOD BLD: ABNORMAL
EOSINOPHIL # BLD: 0.2 K/UL (ref 0–0.4)
EOSINOPHIL NFR BLD: 3 % (ref 0–7)
ERYTHROCYTE [DISTWIDTH] IN BLOOD BY AUTOMATED COUNT: 15.2 % (ref 11.5–14.5)
EST. AVERAGE GLUCOSE BLD GHB EST-MCNC: 137 MG/DL
GLOBULIN SER CALC-MCNC: 3.8 G/DL (ref 2–4)
GLUCOSE SERPL-MCNC: 123 MG/DL (ref 65–100)
HBA1C MFR BLD: 6.4 % (ref 4–5.6)
HCT VFR BLD AUTO: 42.8 % (ref 35–47)
HGB BLD-MCNC: 14 G/DL (ref 11.5–16)
IMM GRANULOCYTES # BLD AUTO: 0 K/UL (ref 0–0.04)
IMM GRANULOCYTES NFR BLD AUTO: 0 % (ref 0–0.5)
LYMPHOCYTES # BLD: 2.9 K/UL (ref 0.8–3.5)
LYMPHOCYTES NFR BLD: 40 % (ref 12–49)
MCH RBC QN AUTO: 28.3 PG (ref 26–34)
MCHC RBC AUTO-ENTMCNC: 32.7 G/DL (ref 30–36.5)
MCV RBC AUTO: 86.6 FL (ref 80–99)
MICROALBUMIN UR-MCNC: 0.72 MG/DL
MICROALBUMIN/CREAT UR-RTO: 8 MG/G (ref 0–30)
MONOCYTES # BLD: 0.4 K/UL (ref 0–1)
MONOCYTES NFR BLD: 5 % (ref 5–13)
NEUTS SEG # BLD: 3.7 K/UL (ref 1.8–8)
NEUTS SEG NFR BLD: 51 % (ref 32–75)
NRBC # BLD: 0 K/UL (ref 0–0.01)
NRBC BLD-RTO: 0 PER 100 WBC
PLATELET # BLD AUTO: 196 K/UL (ref 150–400)
PMV BLD AUTO: 12.3 FL (ref 8.9–12.9)
POTASSIUM SERPL-SCNC: 3.7 MMOL/L (ref 3.5–5.1)
PROT SERPL-MCNC: 7.7 G/DL (ref 6.4–8.2)
RBC # BLD AUTO: 4.94 M/UL (ref 3.8–5.2)
SODIUM SERPL-SCNC: 142 MMOL/L (ref 136–145)
WBC # BLD AUTO: 7.2 K/UL (ref 3.6–11)

## 2021-03-29 ENCOUNTER — IMMUNIZATION (OUTPATIENT)
Dept: INTERNAL MEDICINE CLINIC | Age: 71
End: 2021-03-29
Payer: MEDICARE

## 2021-03-29 DIAGNOSIS — Z23 ENCOUNTER FOR IMMUNIZATION: Primary | ICD-10-CM

## 2021-03-29 PROCEDURE — 0001A COVID-19, MRNA, LNP-S, PF, 30MCG/0.3ML DOSE(PFIZER): CPT | Performed by: FAMILY MEDICINE

## 2021-03-29 PROCEDURE — 91300 COVID-19, MRNA, LNP-S, PF, 30MCG/0.3ML DOSE(PFIZER): CPT | Performed by: FAMILY MEDICINE

## 2021-04-19 ENCOUNTER — IMMUNIZATION (OUTPATIENT)
Dept: INTERNAL MEDICINE CLINIC | Age: 71
End: 2021-04-19
Payer: MEDICARE

## 2021-04-19 DIAGNOSIS — Z23 ENCOUNTER FOR IMMUNIZATION: Primary | ICD-10-CM

## 2021-04-19 PROCEDURE — 0002A COVID-19, MRNA, LNP-S, PF, 30MCG/0.3ML DOSE(PFIZER): CPT | Performed by: FAMILY MEDICINE

## 2021-04-19 PROCEDURE — 91300 COVID-19, MRNA, LNP-S, PF, 30MCG/0.3ML DOSE(PFIZER): CPT | Performed by: FAMILY MEDICINE

## 2021-07-21 ENCOUNTER — OFFICE VISIT (OUTPATIENT)
Dept: INTERNAL MEDICINE CLINIC | Age: 71
End: 2021-07-21
Payer: MEDICARE

## 2021-07-21 VITALS
TEMPERATURE: 97.5 F | SYSTOLIC BLOOD PRESSURE: 148 MMHG | DIASTOLIC BLOOD PRESSURE: 88 MMHG | BODY MASS INDEX: 30.22 KG/M2 | OXYGEN SATURATION: 96 % | HEIGHT: 65 IN | WEIGHT: 181.4 LBS | HEART RATE: 75 BPM | RESPIRATION RATE: 16 BRPM

## 2021-07-21 DIAGNOSIS — E11.9 DIABETES MELLITUS TYPE 2, DIET-CONTROLLED (HCC): ICD-10-CM

## 2021-07-21 DIAGNOSIS — E78.2 MIXED HYPERLIPIDEMIA: ICD-10-CM

## 2021-07-21 DIAGNOSIS — Z00.00 MEDICARE ANNUAL WELLNESS VISIT, SUBSEQUENT: Primary | ICD-10-CM

## 2021-07-21 DIAGNOSIS — Z13.31 SCREENING FOR DEPRESSION: ICD-10-CM

## 2021-07-21 DIAGNOSIS — I10 BENIGN HYPERTENSION: ICD-10-CM

## 2021-07-21 DIAGNOSIS — Z79.899 ENCOUNTER FOR LONG-TERM (CURRENT) USE OF MEDICATIONS: ICD-10-CM

## 2021-07-21 LAB
ALBUMIN SERPL-MCNC: 3.7 G/DL (ref 3.5–5)
ALBUMIN/GLOB SERPL: 1 {RATIO} (ref 1.1–2.2)
ALP SERPL-CCNC: 138 U/L (ref 45–117)
ALT SERPL-CCNC: 40 U/L (ref 12–78)
ANION GAP SERPL CALC-SCNC: 5 MMOL/L (ref 5–15)
AST SERPL-CCNC: 25 U/L (ref 15–37)
BASOPHILS # BLD: 0 K/UL (ref 0–0.1)
BASOPHILS NFR BLD: 1 % (ref 0–1)
BILIRUB SERPL-MCNC: 1.2 MG/DL (ref 0.2–1)
BUN SERPL-MCNC: 14 MG/DL (ref 6–20)
BUN/CREAT SERPL: 20 (ref 12–20)
CALCIUM SERPL-MCNC: 10.8 MG/DL (ref 8.5–10.1)
CHLORIDE SERPL-SCNC: 109 MMOL/L (ref 97–108)
CHOLEST SERPL-MCNC: 136 MG/DL
CO2 SERPL-SCNC: 28 MMOL/L (ref 21–32)
CREAT SERPL-MCNC: 0.7 MG/DL (ref 0.55–1.02)
CREAT UR-MCNC: 106 MG/DL
DIFFERENTIAL METHOD BLD: ABNORMAL
EOSINOPHIL # BLD: 0.1 K/UL (ref 0–0.4)
EOSINOPHIL NFR BLD: 2 % (ref 0–7)
ERYTHROCYTE [DISTWIDTH] IN BLOOD BY AUTOMATED COUNT: 15.5 % (ref 11.5–14.5)
EST. AVERAGE GLUCOSE BLD GHB EST-MCNC: 143 MG/DL
GLOBULIN SER CALC-MCNC: 3.8 G/DL (ref 2–4)
GLUCOSE SERPL-MCNC: 124 MG/DL (ref 65–100)
HBA1C MFR BLD: 6.6 % (ref 4–5.6)
HCT VFR BLD AUTO: 43 % (ref 35–47)
HDLC SERPL-MCNC: 49 MG/DL
HDLC SERPL: 2.8 {RATIO} (ref 0–5)
HGB BLD-MCNC: 14.5 G/DL (ref 11.5–16)
IMM GRANULOCYTES # BLD AUTO: 0.1 K/UL (ref 0–0.04)
IMM GRANULOCYTES NFR BLD AUTO: 1 % (ref 0–0.5)
LDLC SERPL CALC-MCNC: 70.4 MG/DL (ref 0–100)
LYMPHOCYTES # BLD: 2.9 K/UL (ref 0.8–3.5)
LYMPHOCYTES NFR BLD: 40 % (ref 12–49)
MCH RBC QN AUTO: 29.1 PG (ref 26–34)
MCHC RBC AUTO-ENTMCNC: 33.7 G/DL (ref 30–36.5)
MCV RBC AUTO: 86.3 FL (ref 80–99)
MICROALBUMIN UR-MCNC: 1.43 MG/DL
MICROALBUMIN/CREAT UR-RTO: 13 MG/G (ref 0–30)
MONOCYTES # BLD: 0.5 K/UL (ref 0–1)
MONOCYTES NFR BLD: 7 % (ref 5–13)
NEUTS SEG # BLD: 3.6 K/UL (ref 1.8–8)
NEUTS SEG NFR BLD: 49 % (ref 32–75)
NRBC # BLD: 0 K/UL (ref 0–0.01)
NRBC BLD-RTO: 0 PER 100 WBC
PLATELET # BLD AUTO: 207 K/UL (ref 150–400)
PMV BLD AUTO: 12.3 FL (ref 8.9–12.9)
POTASSIUM SERPL-SCNC: 3.8 MMOL/L (ref 3.5–5.1)
PROT SERPL-MCNC: 7.5 G/DL (ref 6.4–8.2)
RBC # BLD AUTO: 4.98 M/UL (ref 3.8–5.2)
SODIUM SERPL-SCNC: 142 MMOL/L (ref 136–145)
TRIGL SERPL-MCNC: 83 MG/DL (ref ?–150)
VLDLC SERPL CALC-MCNC: 16.6 MG/DL
WBC # BLD AUTO: 7.3 K/UL (ref 3.6–11)

## 2021-07-21 PROCEDURE — 1101F PT FALLS ASSESS-DOCD LE1/YR: CPT | Performed by: INTERNAL MEDICINE

## 2021-07-21 PROCEDURE — 3044F HG A1C LEVEL LT 7.0%: CPT | Performed by: INTERNAL MEDICINE

## 2021-07-21 PROCEDURE — G0463 HOSPITAL OUTPT CLINIC VISIT: HCPCS | Performed by: INTERNAL MEDICINE

## 2021-07-21 PROCEDURE — G9899 SCRN MAM PERF RSLTS DOC: HCPCS | Performed by: INTERNAL MEDICINE

## 2021-07-21 PROCEDURE — G8753 SYS BP > OR = 140: HCPCS | Performed by: INTERNAL MEDICINE

## 2021-07-21 PROCEDURE — G8417 CALC BMI ABV UP PARAM F/U: HCPCS | Performed by: INTERNAL MEDICINE

## 2021-07-21 PROCEDURE — G8399 PT W/DXA RESULTS DOCUMENT: HCPCS | Performed by: INTERNAL MEDICINE

## 2021-07-21 PROCEDURE — G8510 SCR DEP NEG, NO PLAN REQD: HCPCS | Performed by: INTERNAL MEDICINE

## 2021-07-21 PROCEDURE — 3017F COLORECTAL CA SCREEN DOC REV: CPT | Performed by: INTERNAL MEDICINE

## 2021-07-21 PROCEDURE — G8754 DIAS BP LESS 90: HCPCS | Performed by: INTERNAL MEDICINE

## 2021-07-21 PROCEDURE — G8427 DOCREV CUR MEDS BY ELIG CLIN: HCPCS | Performed by: INTERNAL MEDICINE

## 2021-07-21 PROCEDURE — 99213 OFFICE O/P EST LOW 20 MIN: CPT | Performed by: INTERNAL MEDICINE

## 2021-07-21 PROCEDURE — 1090F PRES/ABSN URINE INCON ASSESS: CPT | Performed by: INTERNAL MEDICINE

## 2021-07-21 PROCEDURE — G8536 NO DOC ELDER MAL SCRN: HCPCS | Performed by: INTERNAL MEDICINE

## 2021-07-21 PROCEDURE — 2022F DILAT RTA XM EVC RTNOPTHY: CPT | Performed by: INTERNAL MEDICINE

## 2021-07-21 RX ORDER — OLMESARTAN MEDOXOMIL AND HYDROCHLOROTHIAZIDE 20/12.5 20; 12.5 MG/1; MG/1
TABLET ORAL
Qty: 90 TABLET | Refills: 1 | Status: SHIPPED | OUTPATIENT
Start: 2021-07-21 | End: 2022-03-21

## 2021-07-21 RX ORDER — OLMESARTAN MEDOXOMIL, AMLODIPINE AND HYDROCHLOROTHIAZIDE TABLET 20/5/12.5 MG 20; 5; 12.5 MG/1; MG/1; MG/1
TABLET ORAL
COMMUNITY
End: 2021-07-21 | Stop reason: ALTCHOICE

## 2021-07-21 NOTE — PROGRESS NOTES
Assessment/Plan:     1. Diabetes mellitus type 2, diet-controlled ([facility] [address]  -maintaining diabetes mellitus diet.   -check labs today. - CBC WITH AUTOMATED DIFF; Future  - METABOLIC PANEL, COMPREHENSIVE; Future  - LIPID PANEL; Future  - HEMOGLOBIN A1C WITH EAG; Future  - MICROALBUMIN, UR, RAND W/ MICROALB/CREAT RATIO; Future  -  DIABETES FOOT EXAM    2. Benign hypertension  -has not taken her blood pressure medication yet this morning. Reading slight elevated today in office.   -continue to monitor blood pressures at home. Bring home monitor in for calibration at next visit    - CBC WITH AUTOMATED DIFF; Future  - METABOLIC PANEL, COMPREHENSIVE; Future    3. Mixed hyperlipidemia  -due for fasting lipid panel   -states eating low fat diet. -exercising    - CBC WITH AUTOMATED DIFF; Future  - METABOLIC PANEL, COMPREHENSIVE; Future  - LIPID PANEL; Future    4. Encounter for long-term (current) use of medications      5. Medicare annual wellness visit, subsequent  -completed today  -[facility] decision maker reviewed with patient and updated. - DEPRESSION SCREEN ANNUAL    6. Screening for depression    - DEPRESSION SCREEN ANNUAL     Orders Placed This Encounter    ANNUAL DEPRESSION SCREEN 8-15 MIN    CBC WITH AUTOMATED DIFF     Standing Status:   Future     Standing Expiration Date:   5/81/8954    METABOLIC PANEL, COMPREHENSIVE     Standing Status:   Future     Standing Expiration Date:   7/21/2022    LIPID PANEL     Standing Status:   Future     Standing Expiration Date:   7/21/2022    HEMOGLOBIN A1C WITH EAG     Standing Status:   Future     Standing Expiration Date:   7/21/2022    MICROALBUMIN, UR, RAND W/ MICROALB/CREAT RATIO     Standing Status:   Future     Standing Expiration Date:   7/21/2022     DIABETES FOOT EXAM    DISCONTD: Olmesartan-amLODIPine-HCTZ 20-5-12.5 mg tab     Sig: olmesartan 20 mg-amlodipine 5 mg-hydrochlorothiazide 12.5 mg tablet   Take 1 tablet every day by oral route.     olmesartan-hydroCHLOROthiazide (BENICAR HCT) 20-12.5 mg per tablet     Sig: TAKE 1 TABLET BY MOUTH EVERY DAY     Dispense:  90 Tablet     Refill:  1        Follow-up Disposition:       Follow up in 6 months       Disclaimer:  Return if symptoms worsen or fail to improve. Advised patient to call back or return to office if symptoms worsen/change/persist.     Discussed expected course/resolution/complications of diagnosis in detail with patient. Medication risks/benefits/costs/interactions/alternatives discussed with patient. Patient was given an after visit summary which includes diagnoses, current medications, & vitals. Patient expressed understanding with the diagnosis and plan. Subjective:      Michelle Ruiz is a 66 y.o. female who presents today for follow up of her diet controlled diabetes mellitus type 2, hypertension and hyperlipidemia. Since last visit:  -doesn't check glucose at home. Following diabetes mellitus diet. Also watching her salt intake.   -denies polyuria, polydipsia, nocturia, nausea/vomiting, bowel changes, chest pain, syncope, dyspnea or lightheadedness. -exercising - walking tape indoors and stretching  -blood pressures at home - systolic in the 987'H. -not able to get in touch with her dermatologist , would like recommendation for new provider. Patient Care Team:  -Dr. Tonya Oconnell, gyn   -Dr. Eron Isabel, colorectal surgeon - colonoscopy   -Dr. Anali Geller - dermatology    Due for:  -Medicare Wellness Exam     Current Outpatient Medications   Medication Sig Dispense Refill    atorvastatin (LIPITOR) 10 mg tablet TAKE 1 TABLET BY MOUTH EVERY DAY 90 Tab 1    CHOLECALCIFEROL, VITAMIN D3, (VITAMIN D3 PO) Take 2,000 Units by mouth daily.  CYANOCOBALAMIN, VITAMIN B-12, (VITAMIN B-12 PO) Take  by mouth daily. Review of Systems    Pertinent items are noted in HPI. Objective:      Wt Readings from Last 3 Encounters:   07/21/21 181 lb 6.4 oz (82.3 kg) 03/17/21 179 lb 12.8 oz (81.6 kg)   08/10/20 183 lb (83 kg)     BP Readings from Last 3 Encounters:   07/21/21 (!) 148/88   03/17/21 139/68   08/10/20 120/75     Visit Vitals  BP (!) 148/88   Pulse 75   Temp 97.5 °F (36.4 °C) (Temporal)   Resp 16   Ht 5' 5\" (1.651 m)   Wt 181 lb 6.4 oz (82.3 kg)   SpO2 96%   BMI 30.19 kg/m²     General appearance: alert, cooperative, no distress, appears stated age  Head: Normocephalic, without obvious abnormality, atraumatic  Neck: supple, symmetrical, trachea midline, no adenopathy, no carotid bruit and no JVD  Lungs: clear to auscultation bilaterally  Heart: regular rate and rhythm, S1, S2 normal, no murmur, click, rub or gallop  Abdomen: soft, non-tender. Bowel sounds normal. No masses,  no organomegaly  Extremities: extremities normal, atraumatic, no cyanosis or edema  Diabetic foot exam:     Left Foot:   Visual Exam: normal    Pulse DP: 2+ (normal)   Filament test: normal sensation    Vibratory sensation: normal      Right Foot:   Visual Exam: normal    Pulse DP: 2+ (normal)   Filament test: normal sensation    Vibratory sensation: normal                 This is the Subsequent Medicare Annual Wellness Exam, performed 12 months or more after the Initial AWV or the last Subsequent AWV    I have reviewed the patient's medical history in detail and updated the computerized patient record. Assessment/Plan   Education and counseling provided:  Are appropriate based on today's review and evaluation    1. Medicare annual wellness visit, subsequent  -     [address]  2. Diabetes mellitus type 2, diet-controlled ([facility] [address]  3. Benign hypertension  4. Mixed hyperlipidemia  5. Encounter for long-term (current) use of medications  6.  Screening for depression  -     DEPRESSION SCREEN ANNUAL       Depression Risk Factor Screening     3 most recent PHQ Screens 7/21/2021   Little interest or pleasure in doing things Not at all   Feeling down, depressed, irritable, or hopeless Not at all   Total Score PHQ 2 0       Alcohol Risk Screen    Do you average more than 1 drink per night or more than 7 drinks a week:  No    On any one occasion in the past three months have you have had more than 3 drinks containing alcohol:  No        Functional Ability and Level of Safety    Hearing: Hearing is good. Activities of Daily Living: The home contains: no safety equipment. Patient does total self care      Ambulation: with no difficulty     Fall Risk:  Fall Risk Assessment, last 12 mths 7/21/2021   Able to walk? Yes   Fall in past 12 months? 0   Do you feel unsteady? 0   Are you worried about falling 0      Abuse Screen:  Patient is not abused       Cognitive Screening    Has your family/caregiver stated any concerns about your memory: no         Health Maintenance Due     Health Maintenance Due   Topic Date Due    Foot Exam Q1  08/10/2021       Patient Care Team   Patient Care Team:  Cleveland Wei MD as PCP - General (Internal Medicine)  Cleveland Wei MD as PCP - [facility] Empaneled Provider    History     Patient Active Problem List   Diagnosis Code    Essential hypertension I10    History of screening mammography Z92.89    Pap smear for cervical cancer screening Z12.4    Screen for colon cancer Z12.11    Diabetes mellitus, type 2 ([facility] [address] E11.9    History of bone density study Z92.89    Hyperlipidemia E78.5    Routine eye exam Z01.00    Advance directive in chart Z78.9     Past Medical History:   Diagnosis Date    Pap smear for cervical cancer screening 3/22/2012    Post-menopause       Past Surgical History:   Procedure Laterality Date    HX COLONOSCOPY  02/02/2018    nml per pt    HX WISDOM TEETH EXTRACTION Right 03/12/2018    x1 upper     Current Outpatient Medications   Medication Sig Dispense Refill    atorvastatin (LIPITOR) 10 mg tablet TAKE 1 TABLET BY MOUTH EVERY DAY 90 Tab 1    CHOLECALCIFEROL, VITAMIN D3, (VITAMIN D3 PO) Take 2,000 Units by mouth daily.       CYANOCOBALAMIN, VITAMIN B-12, (VITAMIN B-12 PO) Take  by mouth daily. Allergies   Allergen Reactions    Avocado Swelling     Tongue swelling    Cocoa Unknown (comments)     Pt states she is not allergic to Cocoa; just Coconut.      Coconut Swelling     Swelling of the tongue     Peach Itching    Peanut Swelling     Tongue swelling       Family History   Problem Relation Age of Onset    Breast Cancer Mother         29's     Social History     Tobacco Use    Smoking status: Never Smoker    Smokeless tobacco: Never Used   Substance Use Topics    Alcohol use: No         Nitin Jordan MD

## 2021-07-21 NOTE — PATIENT INSTRUCTIONS
Medicare Wellness Visit, Female     The best way to live healthy is to have a lifestyle where you eat a well-balanced diet, exercise regularly, limit alcohol use, and quit all forms of tobacco/nicotine, if applicable. Regular preventive services are another way to keep healthy. Preventive services (vaccines, screening tests, monitoring & exams) can help personalize your care plan, which helps you manage your own care. Screening tests can find health problems at the earliest stages, when they are easiest to treat. [facility] follows the current, evidence-based guidelines published by the [facility] ([facility]) when recommending preventive services for our patients. Because we follow these guidelines, sometimes recommendations change over time as research supports it. (For example, mammograms used to be recommended annually. Even though Medicare will still pay for an annual mammogram, the newer guidelines recommend a mammogram every two years for women of average risk). Of course, you and your doctor may decide to screen more often for some diseases, based on your risk and your co-morbidities (chronic disease you are already diagnosed with). Preventive services for you include:  - Medicare offers their members a free annual wellness visit, which is time for you and your primary care provider to discuss and plan for your preventive service needs. Take advantage of this benefit every year!  -All adults over the age of 72 should receive the recommended pneumonia vaccines. Current USPSTF guidelines recommend a series of two vaccines for the best pneumonia protection.   -All adults should have a flu vaccine yearly and a tetanus vaccine every 10 years.   -All adults age 48 and older should receive the shingles vaccines (series of two vaccines).       -All adults age 38-68 who are overweight should have a diabetes screening test once every three years.   -All adults born between [birth year] and 1966 should be screened once for Hepatitis C.  -Other screening tests and preventive services for persons with diabetes include: an eye exam to screen for diabetic retinopathy, a kidney function test, a foot exam, and stricter control over your cholesterol.   -Cardiovascular screening for adults with routine risk involves an electrocardiogram (ECG) at intervals determined by your doctor.   -Colorectal cancer screenings should be done for adults age 54-65 with no increased risk factors for colorectal cancer. There are a number of acceptable methods of screening for this type of cancer. Each test has its own benefits and drawbacks. Discuss with your doctor what is most appropriate for you during your annual wellness visit. The different tests include: colonoscopy (considered the best screening method), a fecal occult blood test, a fecal DNA test, and sigmoidoscopy.    -A bone mass density test is recommended when a woman turns 65 to screen for osteoporosis. This test is only recommended one time, as a screening. Some providers will use this same test as a disease monitoring tool if you already have osteoporosis. -Breast cancer screenings are recommended every other year for women of normal risk, age 54-69.  -Cervical cancer screenings for women over age 72 are only recommended with certain risk factors.        [facility] - [phone]

## 2021-10-15 ENCOUNTER — HOSPITAL ENCOUNTER (OUTPATIENT)
Dept: MAMMOGRAPHY | Age: 71
Discharge: HOME | End: 2021-10-15
Attending: INTERNAL MEDICINE
Payer: MEDICARE

## 2021-10-15 DIAGNOSIS — Z12.31 VISIT FOR SCREENING MAMMOGRAM: ICD-10-CM

## 2021-10-15 PROCEDURE — 77067 SCR MAMMO BI INCL CAD: CPT

## 2021-11-03 ENCOUNTER — TELEPHONE (OUTPATIENT)
Dept: INTERNAL MEDICINE CLINIC | Age: 71
End: 2021-11-03

## 2021-11-03 NOTE — TELEPHONE ENCOUNTER
----- Message from Kun Burch sent at 11/3/2021 12:50 PM EDT -----  Subject: Message to Provider    QUESTIONS  Information for Provider? Pt is scheduled to have a pre-op appt for   11/4/21. She wanted to know if she had to fast for her blood work  ---------------------------------------------------------------------------  --------------  [address]  What is the best way for the office to contact you? OK to leave message on   voicemail  Preferred Call Back Phone Number?  [phone]  ---------------------------------------------------------------------------  --------------  SCRIPT ANSWERS  undefined

## 2021-11-04 ENCOUNTER — OFFICE VISIT (OUTPATIENT)
Dept: INTERNAL MEDICINE CLINIC | Age: 71
End: 2021-11-04
Payer: MEDICARE

## 2021-11-04 VITALS
DIASTOLIC BLOOD PRESSURE: 77 MMHG | BODY MASS INDEX: 30.62 KG/M2 | HEIGHT: 65 IN | OXYGEN SATURATION: 95 % | TEMPERATURE: 97.8 F | SYSTOLIC BLOOD PRESSURE: 187 MMHG | HEART RATE: 74 BPM | WEIGHT: 183.8 LBS | RESPIRATION RATE: 20 BRPM

## 2021-11-04 DIAGNOSIS — E78.49 OTHER HYPERLIPIDEMIA: ICD-10-CM

## 2021-11-04 DIAGNOSIS — I10 HYPERTENSION, UNSPECIFIED TYPE: ICD-10-CM

## 2021-11-04 DIAGNOSIS — Z01.818 PRE-OP EVALUATION: Primary | ICD-10-CM

## 2021-11-04 PROCEDURE — G8753 SYS BP > OR = 140: HCPCS | Performed by: INTERNAL MEDICINE

## 2021-11-04 PROCEDURE — G0463 HOSPITAL OUTPT CLINIC VISIT: HCPCS | Performed by: INTERNAL MEDICINE

## 2021-11-04 PROCEDURE — G8510 SCR DEP NEG, NO PLAN REQD: HCPCS | Performed by: INTERNAL MEDICINE

## 2021-11-04 PROCEDURE — G8536 NO DOC ELDER MAL SCRN: HCPCS | Performed by: INTERNAL MEDICINE

## 2021-11-04 PROCEDURE — 1090F PRES/ABSN URINE INCON ASSESS: CPT | Performed by: INTERNAL MEDICINE

## 2021-11-04 PROCEDURE — 3017F COLORECTAL CA SCREEN DOC REV: CPT | Performed by: INTERNAL MEDICINE

## 2021-11-04 PROCEDURE — G8417 CALC BMI ABV UP PARAM F/U: HCPCS | Performed by: INTERNAL MEDICINE

## 2021-11-04 PROCEDURE — G9899 SCRN MAM PERF RSLTS DOC: HCPCS | Performed by: INTERNAL MEDICINE

## 2021-11-04 PROCEDURE — G8427 DOCREV CUR MEDS BY ELIG CLIN: HCPCS | Performed by: INTERNAL MEDICINE

## 2021-11-04 PROCEDURE — G8754 DIAS BP LESS 90: HCPCS | Performed by: INTERNAL MEDICINE

## 2021-11-04 PROCEDURE — 99214 OFFICE O/P EST MOD 30 MIN: CPT | Performed by: INTERNAL MEDICINE

## 2021-11-04 PROCEDURE — G8399 PT W/DXA RESULTS DOCUMENT: HCPCS | Performed by: INTERNAL MEDICINE

## 2021-11-04 PROCEDURE — 1101F PT FALLS ASSESS-DOCD LE1/YR: CPT | Performed by: INTERNAL MEDICINE

## 2021-11-04 RX ORDER — SODIUM CHLORIDE 50 MG/ML
SOLUTION/ DROPS OPHTHALMIC
COMMUNITY
Start: 2021-11-03 | End: 2022-07-25 | Stop reason: ALTCHOICE

## 2021-11-04 RX ORDER — KETOROLAC TROMETHAMINE 5 MG/ML
SOLUTION OPHTHALMIC
COMMUNITY
Start: 2021-10-22 | End: 2022-07-25 | Stop reason: ALTCHOICE

## 2021-11-04 RX ORDER — PREDNISOLONE ACETATE 10 MG/ML
SUSPENSION/ DROPS OPHTHALMIC
COMMUNITY
Start: 2021-10-22

## 2021-11-04 RX ORDER — OFLOXACIN 3 MG/ML
SOLUTION/ DROPS OPHTHALMIC
COMMUNITY
Start: 2021-10-22 | End: 2022-07-25 | Stop reason: ALTCHOICE

## 2021-11-04 NOTE — PROGRESS NOTES
Marci Estrella presents for preop evaluation:   Surgeon : Dr. Melanie Alan  Type of surgery : Cataract surgery, laser surgery OS  Surgery site : [address]  Anesthesia type: ? local  Date of procedure:  12/1/21    Allergies: Allergies   Allergen Reactions    Avocado Swelling     Tongue swelling    Cocoa Unknown (comments)     Pt states she is not allergic to Cocoa; just Coconut.  Coconut Swelling     Swelling of the tongue     Peach Itching    Peanut Swelling     Tongue swelling     Latex allergy: no    Current Outpatient Medications:     sodium chloride (STEWART-5) 5 % ophthalmic solution, , Disp: , Rfl:     atorvastatin (LIPITOR) 10 mg tablet, TAKE 1 TABLET BY MOUTH EVERY DAY, Disp: 90 Tablet, Rfl: 1    olmesartan-hydroCHLOROthiazide (BENICAR HCT) 20-12.5 mg per tablet, TAKE 1 TABLET BY MOUTH EVERY DAY, Disp: 90 Tablet, Rfl: 1    CHOLECALCIFEROL, VITAMIN D3, (VITAMIN D3 PO), Take 2,000 Units by mouth daily. , Disp: , Rfl:     CYANOCOBALAMIN, VITAMIN B-12, (VITAMIN B-12 PO), Take  by mouth daily. , Disp: , Rfl:     ketorolac (ACULAR) 0.5 % ophthalmic solution, , Disp: , Rfl:     ofloxacin (FLOXIN) 0.3 % ophthalmic solution, , Disp: , Rfl:     prednisoLONE acetate (PRED FORTE) 1 % ophthalmic suspension, , Disp: , Rfl:   Patient Active Problem List   Diagnosis Code    Essential hypertension I10    History of screening mammography Z92.89    Pap smear for cervical cancer screening Z12.4    Screen for colon cancer Z12.11    Diabetes mellitus, type 2 ([facility] [address] E11.9    History of bone density study Z92.89    Hyperlipidemia E78.5    Routine eye exam Z01.00    Advance directive in chart Z78.9     Past Surgical History:   Procedure Laterality Date    HX COLONOSCOPY  02/02/2018    nml per pt    HX WISDOM TEETH EXTRACTION Right 03/12/2018    x1 upper     Social History     Tobacco Use    Smoking status: Never Smoker    Smokeless tobacco: Never Used   Vaping Use    Vaping Use: Never used Substance Use Topics    Alcohol use: No    Drug use: No       Review of Systems   Constitutional: Negative for chills and fever. Eyes:        Cataracts   Respiratory: Negative for cough and shortness of breath. Cardiovascular: Negative for chest pain and palpitations. Gastrointestinal: Negative for abdominal pain, constipation, diarrhea, nausea and vomiting. Genitourinary: Negative for dysuria and frequency. Stress incontinence   Musculoskeletal: Negative for joint pain. Neurological: Negative for dizziness, focal weakness, weakness and headaches. Psychiatric/Behavioral: Negative for depression. The patient is not nervous/anxious. Home /67  Repeat 136/90    Physical Exam:   Visit Vitals  BP (!) 187/77 (BP 1 Location: Left upper arm, BP Patient Position: Sitting, BP Cuff Size: Large adult)   Pulse 74   Temp 97.8 °F (36.6 °C) (Temporal)   Resp 20   Ht 5' 5\" (1.651 m)   Wt 183 lb 12.8 oz (83.4 kg)   SpO2 95%   BMI 30.59 kg/m²     HEENT: normocephalic, extraocular movements intact,  Oropharynx: clear. No erythema, exudate, or drainage  Nose: no drainage  Sinuses: nontender  Ears: tympanic membrane's and canals normal.  No erythema, fluid, drainage  Neck: supple, without lymphadenopathy or thyromegaly  Patient appears well  Heart[de-identified] normal rate, regular rhythm, normal S1, S2, no murmurs, rubs, clicks or gallops. Chest: clear to auscultation, no wheezes, rales or rhonchi,   Abdomen: soft, non tender, no mass or hepatosplenomegaly   Extremities: no edema  Neuro: non focal  Psych: normal mood and affect    Assessment/Plan:    There are no medical contraindications to eye surgery    Hypertension: initial blood pressure was elevated, improved on repeat. She will continue to follow home readings and follow up with Kayden Dave MD as planned  Diabetes Type 2: stable.  Last Hgb A1C 6.6 % 7/21/21  Hyperlipidemia: stable on Atorvastatin  Stress incontinence: recommended and reviewed Carol

## 2021-11-04 NOTE — PATIENT INSTRUCTIONS
follow up with Dr Carlos Costello as planned  Please check home blood pressure readings 2-3x/week  Call if BP is running > 160/95 or consistently running high at home    Call or return to clinic if these symptoms worsen or fail to improve as anticipated.

## 2021-11-04 NOTE — PROGRESS NOTES
Chief Complaint   Patient presents with    Pre-op Exam     OS surgery     Form Completion       1. Have you been to the ER, urgent care clinic since your last visit? Hospitalized since your last visit? No    2. Have you seen or consulted any other health care providers outside of the [address] [place] [address] since your last visit? Include any pap smears or colon screening.  No

## 2021-11-10 ENCOUNTER — TELEPHONE (OUTPATIENT)
Dept: INTERNAL MEDICINE CLINIC | Age: 71
End: 2021-11-10

## 2021-11-10 NOTE — TELEPHONE ENCOUNTER
Patient states that at her pre op appt with Dr. Refugio Correa, her BP was 187/77 she is scheduled to have cataract surgery on 12/1/21. She states that when she went to [facility] and checked it, it was 131/75 HR 58. She want to know if is anything she can take to keep her BP stable, because she had a friend that had cataract surgery and his BP spiked during the surgery and he went blind. She does not want that to happen to her, she did not eat or drink the day of her appt so she think that might have been the reason it was elevated.

## 2021-11-10 NOTE — TELEPHONE ENCOUNTER
----- Message from Usman Rosas sent at 11/10/2021  8:14 AM EST -----  Subject: Message to Provider    QUESTIONS  Information for Provider? PT wants to speak with Dr or Nurse in regards to   eye surgery - BP has gone up   ---------------------------------------------------------------------------  --------------  CALL BACK INFO  What is the best way for the office to contact you? OK to leave message on   voicemail  Preferred Call Back Phone Number? [phone]  ---------------------------------------------------------------------------  --------------  SCRIPT ANSWERS  Relationship to Patient?  Self

## 2022-01-21 ENCOUNTER — OFFICE VISIT (OUTPATIENT)
Dept: INTERNAL MEDICINE CLINIC | Age: 72
End: 2022-01-21
Payer: MEDICARE

## 2022-01-21 VITALS
OXYGEN SATURATION: 95 % | WEIGHT: 182.2 LBS | SYSTOLIC BLOOD PRESSURE: 136 MMHG | BODY MASS INDEX: 30.35 KG/M2 | DIASTOLIC BLOOD PRESSURE: 78 MMHG | HEART RATE: 62 BPM | TEMPERATURE: 97.3 F | HEIGHT: 65 IN | RESPIRATION RATE: 16 BRPM

## 2022-01-21 DIAGNOSIS — E78.2 MIXED HYPERLIPIDEMIA: ICD-10-CM

## 2022-01-21 DIAGNOSIS — E11.9 DIABETES MELLITUS TYPE 2, DIET-CONTROLLED (HCC): Primary | ICD-10-CM

## 2022-01-21 DIAGNOSIS — Z79.899 ENCOUNTER FOR LONG-TERM (CURRENT) USE OF MEDICATIONS: ICD-10-CM

## 2022-01-21 DIAGNOSIS — I10 BENIGN HYPERTENSION: ICD-10-CM

## 2022-01-21 PROCEDURE — G8536 NO DOC ELDER MAL SCRN: HCPCS | Performed by: INTERNAL MEDICINE

## 2022-01-21 PROCEDURE — G8752 SYS BP LESS 140: HCPCS | Performed by: INTERNAL MEDICINE

## 2022-01-21 PROCEDURE — 3046F HEMOGLOBIN A1C LEVEL >9.0%: CPT | Performed by: INTERNAL MEDICINE

## 2022-01-21 PROCEDURE — 2022F DILAT RTA XM EVC RTNOPTHY: CPT | Performed by: INTERNAL MEDICINE

## 2022-01-21 PROCEDURE — G9899 SCRN MAM PERF RSLTS DOC: HCPCS | Performed by: INTERNAL MEDICINE

## 2022-01-21 PROCEDURE — G8754 DIAS BP LESS 90: HCPCS | Performed by: INTERNAL MEDICINE

## 2022-01-21 PROCEDURE — G8427 DOCREV CUR MEDS BY ELIG CLIN: HCPCS | Performed by: INTERNAL MEDICINE

## 2022-01-21 PROCEDURE — 3017F COLORECTAL CA SCREEN DOC REV: CPT | Performed by: INTERNAL MEDICINE

## 2022-01-21 PROCEDURE — 99214 OFFICE O/P EST MOD 30 MIN: CPT | Performed by: INTERNAL MEDICINE

## 2022-01-21 PROCEDURE — 1090F PRES/ABSN URINE INCON ASSESS: CPT | Performed by: INTERNAL MEDICINE

## 2022-01-21 PROCEDURE — G8510 SCR DEP NEG, NO PLAN REQD: HCPCS | Performed by: INTERNAL MEDICINE

## 2022-01-21 PROCEDURE — G0463 HOSPITAL OUTPT CLINIC VISIT: HCPCS | Performed by: INTERNAL MEDICINE

## 2022-01-21 PROCEDURE — G8417 CALC BMI ABV UP PARAM F/U: HCPCS | Performed by: INTERNAL MEDICINE

## 2022-01-21 PROCEDURE — G8399 PT W/DXA RESULTS DOCUMENT: HCPCS | Performed by: INTERNAL MEDICINE

## 2022-01-21 PROCEDURE — 1101F PT FALLS ASSESS-DOCD LE1/YR: CPT | Performed by: INTERNAL MEDICINE

## 2022-01-21 NOTE — PROGRESS NOTES
Assessment/Plan:     1. Diabetes mellitus type 2, diet-controlled ([facility] [address]  -working on weight reduction.   -labs today. - METABOLIC PANEL, COMPREHENSIVE; Future  - HEMOGLOBIN A1C WITH EAG; Future  - MICROALBUMIN, UR, RAND W/ MICROALB/CREAT RATIO; Future  -  DIABETES FOOT EXAM    2. Benign hypertension  -home cuff readings are inconsistent. May need new meter.  -bp normal on exam today. No medication adjustment    3. Mixed hyperlipidemia  -last fasting lipid panel done 7/2021. Controlled. -compliant with atorvastatin 10mg daily. 4. Encounter for long-term (current) use of medications     Orders Placed This Encounter    METABOLIC PANEL, COMPREHENSIVE     Standing Status:   Future     Standing Expiration Date:   1/21/2023    HEMOGLOBIN A1C WITH EAG     Standing Status:   Future     Standing Expiration Date:   1/21/2023    MICROALBUMIN, UR, RAND W/ MICROALB/CREAT RATIO     Standing Status:   Future     Standing Expiration Date:   1/21/2023     DIABETES FOOT EXAM          Follow-up Disposition:     Follow up in 6 months               Subjective:      Chai De La Garza is a 66 y.o. female who presents today for . follow up of her diabetes mellitus type 2, hypertension and hyperlipidemia      Since last visit 7/21/2021:  -had left cataract extraction with lens implant in 12/2021 with Dr. Alessandro Yee  -she brought her home blood pressure cuff. Patient Care Team:  -Dr. Imani Ignacio, gyn   -Dr. Avelino Inman, colorectal surgeon - colonoscopy   -Dr. Lauren Giraldo - dermatology  -Dr. Alessandro Yee, eye care         Objective: Wt Readings from Last 3 Encounters:   01/21/22 182 lb 3.2 oz (82.6 kg)   11/04/21 183 lb 12.8 oz (83.4 kg)   07/21/21 181 lb 6.4 oz (82.3 kg)     BP Readings from Last 3 Encounters:   01/21/22 136/78   11/04/21 (!) 187/77   07/21/21 (!) 148/88     Home cuff reading systolic in the 629'X.     Visit Vitals  /78   Pulse 62   Temp 97.3 °F (36.3 °C) (Temporal)   Resp 16   Ht 5' 5\" (1.651 m)   Wt 182 lb 3.2 oz (82.6 kg)   SpO2 95%   BMI 30.32 kg/m²     General appearance: alert, cooperative, no distress, appears stated age  Head: Normocephalic, without obvious abnormality, atraumatic  Neck: supple, symmetrical, trachea midline, no adenopathy, thyroid: not enlarged, symmetric, no tenderness/mass/nodules, no carotid bruit and no JVD  Lungs: clear to auscultation bilaterally  Heart: regular rate and rhythm, S1, S2 normal, no murmur, click, rub or gallop  Abdomen: soft, non-tender. Bowel sounds normal. No masses,  no organomegaly    Diabetic foot exam:     Left Foot:   Visual Exam: normal    Pulse DP: 2+ (normal)   Filament test: normal sensation    Vibratory sensation: normal      Right Foot:   Visual Exam: normal    Pulse DP: 2+ (normal)   Filament test: normal sensation    Vibratory sensation: normal             Disclaimer:  Return if symptoms worsen or fail to improve. Advised patient to call back or return to office if symptoms worsen/change/persist.     Discussed expected course/resolution/complications of diagnosis in detail with patient. Medication risks/benefits/costs/interactions/alternatives discussed with patient. Patient was given an after visit summary which includes diagnoses, current medications, & vitals. Patient expressed understanding with the diagnosis and plan.

## 2022-01-22 LAB
ALBUMIN SERPL-MCNC: 3.9 G/DL (ref 3.5–5)
ALBUMIN/GLOB SERPL: 1 {RATIO} (ref 1.1–2.2)
ALP SERPL-CCNC: 158 U/L (ref 45–117)
ALT SERPL-CCNC: 41 U/L (ref 12–78)
ANION GAP SERPL CALC-SCNC: 6 MMOL/L (ref 5–15)
AST SERPL-CCNC: 22 U/L (ref 15–37)
BILIRUB SERPL-MCNC: 1.1 MG/DL (ref 0.2–1)
BUN SERPL-MCNC: 17 MG/DL (ref 6–20)
BUN/CREAT SERPL: 20 (ref 12–20)
CALCIUM SERPL-MCNC: 11.3 MG/DL (ref 8.5–10.1)
CHLORIDE SERPL-SCNC: 109 MMOL/L (ref 97–108)
CO2 SERPL-SCNC: 27 MMOL/L (ref 21–32)
CREAT SERPL-MCNC: 0.83 MG/DL (ref 0.55–1.02)
CREAT UR-MCNC: 48.2 MG/DL
EST. AVERAGE GLUCOSE BLD GHB EST-MCNC: 148 MG/DL
GLOBULIN SER CALC-MCNC: 4.1 G/DL (ref 2–4)
GLUCOSE SERPL-MCNC: 115 MG/DL (ref 65–100)
HBA1C MFR BLD: 6.8 % (ref 4–5.6)
MICROALBUMIN UR-MCNC: 1.17 MG/DL
MICROALBUMIN/CREAT UR-RTO: 24 MG/G (ref 0–30)
POTASSIUM SERPL-SCNC: 3.6 MMOL/L (ref 3.5–5.1)
PROT SERPL-MCNC: 8 G/DL (ref 6.4–8.2)
SODIUM SERPL-SCNC: 142 MMOL/L (ref 136–145)

## 2022-03-20 PROBLEM — Z78.9 ADVANCE DIRECTIVE IN CHART: Status: ACTIVE | Noted: 2017-02-20

## 2022-07-25 ENCOUNTER — OFFICE VISIT (OUTPATIENT)
Dept: INTERNAL MEDICINE CLINIC | Age: 72
End: 2022-07-25
Payer: MEDICARE

## 2022-07-25 VITALS
BODY MASS INDEX: 29.49 KG/M2 | WEIGHT: 177 LBS | TEMPERATURE: 97.8 F | DIASTOLIC BLOOD PRESSURE: 80 MMHG | HEART RATE: 46 BPM | RESPIRATION RATE: 16 BRPM | OXYGEN SATURATION: 95 % | HEIGHT: 65 IN | SYSTOLIC BLOOD PRESSURE: 122 MMHG

## 2022-07-25 DIAGNOSIS — Z00.00 MEDICARE ANNUAL WELLNESS VISIT, SUBSEQUENT: Primary | ICD-10-CM

## 2022-07-25 DIAGNOSIS — I10 BENIGN HYPERTENSION: ICD-10-CM

## 2022-07-25 DIAGNOSIS — E78.2 MIXED HYPERLIPIDEMIA: ICD-10-CM

## 2022-07-25 DIAGNOSIS — Z79.899 ENCOUNTER FOR LONG-TERM (CURRENT) USE OF MEDICATIONS: ICD-10-CM

## 2022-07-25 DIAGNOSIS — E11.9 DIABETES MELLITUS TYPE 2, DIET-CONTROLLED (HCC): ICD-10-CM

## 2022-07-25 DIAGNOSIS — Z13.31 SCREENING FOR DEPRESSION: ICD-10-CM

## 2022-07-25 LAB
ALBUMIN SERPL-MCNC: 3.6 G/DL (ref 3.5–5)
ALBUMIN/GLOB SERPL: 0.9 {RATIO} (ref 1.1–2.2)
ALP SERPL-CCNC: 150 U/L (ref 45–117)
ALT SERPL-CCNC: 32 U/L (ref 12–78)
ANION GAP SERPL CALC-SCNC: 8 MMOL/L (ref 5–15)
AST SERPL-CCNC: 19 U/L (ref 15–37)
BASOPHILS # BLD: 0 K/UL (ref 0–0.1)
BASOPHILS NFR BLD: 1 % (ref 0–1)
BILIRUB SERPL-MCNC: 1.1 MG/DL (ref 0.2–1)
BUN SERPL-MCNC: 16 MG/DL (ref 6–20)
BUN/CREAT SERPL: 19 (ref 12–20)
CALCIUM SERPL-MCNC: 11.3 MG/DL (ref 8.5–10.1)
CHLORIDE SERPL-SCNC: 110 MMOL/L (ref 97–108)
CHOLEST SERPL-MCNC: 133 MG/DL
CO2 SERPL-SCNC: 26 MMOL/L (ref 21–32)
CREAT SERPL-MCNC: 0.83 MG/DL (ref 0.55–1.02)
CREAT UR-MCNC: 57 MG/DL
DIFFERENTIAL METHOD BLD: ABNORMAL
EOSINOPHIL # BLD: 0.2 K/UL (ref 0–0.4)
EOSINOPHIL NFR BLD: 2 % (ref 0–7)
ERYTHROCYTE [DISTWIDTH] IN BLOOD BY AUTOMATED COUNT: 15 % (ref 11.5–14.5)
EST. AVERAGE GLUCOSE BLD GHB EST-MCNC: 151 MG/DL
GLOBULIN SER CALC-MCNC: 4 G/DL (ref 2–4)
GLUCOSE SERPL-MCNC: 122 MG/DL (ref 65–100)
HBA1C MFR BLD: 6.9 % (ref 4–5.6)
HCT VFR BLD AUTO: 45.1 % (ref 35–47)
HDLC SERPL-MCNC: 53 MG/DL
HDLC SERPL: 2.5 {RATIO} (ref 0–5)
HGB BLD-MCNC: 15.4 G/DL (ref 11.5–16)
IMM GRANULOCYTES # BLD AUTO: 0 K/UL (ref 0–0.04)
IMM GRANULOCYTES NFR BLD AUTO: 0 % (ref 0–0.5)
LDLC SERPL CALC-MCNC: 53 MG/DL (ref 0–100)
LYMPHOCYTES # BLD: 3.4 K/UL (ref 0.8–3.5)
LYMPHOCYTES NFR BLD: 41 % (ref 12–49)
MCH RBC QN AUTO: 30 PG (ref 26–34)
MCHC RBC AUTO-ENTMCNC: 34.1 G/DL (ref 30–36.5)
MCV RBC AUTO: 87.7 FL (ref 80–99)
MICROALBUMIN UR-MCNC: 1.55 MG/DL
MICROALBUMIN/CREAT UR-RTO: 27 MG/G (ref 0–30)
MONOCYTES # BLD: 0.4 K/UL (ref 0–1)
MONOCYTES NFR BLD: 5 % (ref 5–13)
NEUTS SEG # BLD: 4.2 K/UL (ref 1.8–8)
NEUTS SEG NFR BLD: 51 % (ref 32–75)
NRBC # BLD: 0 K/UL (ref 0–0.01)
NRBC BLD-RTO: 0 PER 100 WBC
PLATELET # BLD AUTO: 202 K/UL (ref 150–400)
PMV BLD AUTO: 12.3 FL (ref 8.9–12.9)
POTASSIUM SERPL-SCNC: 3.5 MMOL/L (ref 3.5–5.1)
PROT SERPL-MCNC: 7.6 G/DL (ref 6.4–8.2)
RBC # BLD AUTO: 5.14 M/UL (ref 3.8–5.2)
SODIUM SERPL-SCNC: 144 MMOL/L (ref 136–145)
TRIGL SERPL-MCNC: 135 MG/DL (ref ?–150)
VLDLC SERPL CALC-MCNC: 27 MG/DL
WBC # BLD AUTO: 8.3 K/UL (ref 3.6–11)

## 2022-07-25 PROCEDURE — G8754 DIAS BP LESS 90: HCPCS | Performed by: INTERNAL MEDICINE

## 2022-07-25 PROCEDURE — G8399 PT W/DXA RESULTS DOCUMENT: HCPCS | Performed by: INTERNAL MEDICINE

## 2022-07-25 PROCEDURE — 3017F COLORECTAL CA SCREEN DOC REV: CPT | Performed by: INTERNAL MEDICINE

## 2022-07-25 PROCEDURE — G8536 NO DOC ELDER MAL SCRN: HCPCS | Performed by: INTERNAL MEDICINE

## 2022-07-25 PROCEDURE — 1101F PT FALLS ASSESS-DOCD LE1/YR: CPT | Performed by: INTERNAL MEDICINE

## 2022-07-25 PROCEDURE — G8417 CALC BMI ABV UP PARAM F/U: HCPCS | Performed by: INTERNAL MEDICINE

## 2022-07-25 PROCEDURE — G8752 SYS BP LESS 140: HCPCS | Performed by: INTERNAL MEDICINE

## 2022-07-25 PROCEDURE — G9899 SCRN MAM PERF RSLTS DOC: HCPCS | Performed by: INTERNAL MEDICINE

## 2022-07-25 PROCEDURE — 3044F HG A1C LEVEL LT 7.0%: CPT | Performed by: INTERNAL MEDICINE

## 2022-07-25 PROCEDURE — 2022F DILAT RTA XM EVC RTNOPTHY: CPT | Performed by: INTERNAL MEDICINE

## 2022-07-25 PROCEDURE — G8427 DOCREV CUR MEDS BY ELIG CLIN: HCPCS | Performed by: INTERNAL MEDICINE

## 2022-07-25 PROCEDURE — G8510 SCR DEP NEG, NO PLAN REQD: HCPCS | Performed by: INTERNAL MEDICINE

## 2022-07-25 PROCEDURE — G0439 PPPS, SUBSEQ VISIT: HCPCS | Performed by: INTERNAL MEDICINE

## 2022-07-25 NOTE — PROGRESS NOTES
Verified name and birth date for privacy precautions. Chart reviewed in preparation for today's visit. Chief Complaint   Patient presents with    Cholesterol Problem          Health Maintenance Due   Topic    Eye Exam Retinal or Dilated     COVID-19 Vaccine (4 - Booster for Pfizer series)    Lipid Screen     Medicare Yearly Exam          Wt Readings from Last 3 Encounters:   07/25/22 177 lb (80.3 kg)   01/21/22 182 lb 3.2 oz (82.6 kg)   11/04/21 183 lb 12.8 oz (83.4 kg)     Temp Readings from Last 3 Encounters:   07/25/22 97.8 °F (36.6 °C) (Temporal)   01/21/22 97.3 °F (36.3 °C) (Temporal)   11/04/21 97.8 °F (36.6 °C) (Temporal)     BP Readings from Last 3 Encounters:   07/25/22 (!) 144/88   01/21/22 136/78   11/04/21 (!) 187/77     Pulse Readings from Last 3 Encounters:   07/25/22 (!) 46   01/21/22 62   11/04/21 74         Learning Assessment:  :     Learning Assessment 1/7/2019 6/23/2017 5/13/2015 1/9/2014   PRIMARY LEARNER Patient Patient Patient Patient   HIGHEST LEVEL OF EDUCATION - PRIMARY LEARNER  2 YEARS OF COLLEGE - > 4 YEARS OF COLLEGE SOME COLLEGE   BARRIERS PRIMARY LEARNER NONE - NONE NONE   CO-LEARNER CAREGIVER - - No No   PRIMARY LANGUAGE ENGLISH ENGLISH ENGLISH ENGLISH    NEED - - No No   LEARNER PREFERENCE PRIMARY DEMONSTRATION DEMONSTRATION LISTENING OTHER (COMMENT)   LEARNING SPECIAL TOPICS - - no no   ANSWERED BY patient  patient patient patient   RELATIONSHIP SELF SELF SELF SELF   ASSESSMENT COMMENT - - - no       Depression Screening:  :     3 most recent PHQ Screens 7/25/2022   Little interest or pleasure in doing things Not at all   Feeling down, depressed, irritable, or hopeless Not at all   Total Score PHQ 2 0       Fall Risk Assessment:  :     Fall Risk Assessment, last 12 mths 7/25/2022   Able to walk? Yes   Fall in past 12 months? 0   Do you feel unsteady?  0   Are you worried about falling 0       Abuse Screening:  :     Abuse Screening Questionnaire 7/25/2022 8/10/2020 1/7/2019 8/29/2018 6/23/2017 7/10/2014   Do you ever feel afraid of your partner? N N N N N N   Are you in a relationship with someone who physically or mentally threatens you? N N N N N N   Is it safe for you to go home?  Jayne Ghotra

## 2022-07-25 NOTE — PROGRESS NOTES
Assessment/Plan:     1. Diabetes mellitus type 2, diet-controlled ([address]  -maintaining diabetes mellitus diet. Her  is also diabetic.  -working on weight reduction.     - CBC WITH AUTOMATED DIFF; Future  - METABOLIC PANEL, COMPREHENSIVE; Future  - LIPID PANEL; Future  - HEMOGLOBIN A1C WITH EAG; Future  - MICROALBUMIN, UR, RAND W/ MICROALB/CREAT RATIO; Future    2. Benign hypertension  -I evaluated and recommended to continue current doses of medications. - CBC WITH AUTOMATED DIFF; Future  - METABOLIC PANEL, COMPREHENSIVE; Future    3. Mixed hyperlipidemia  -taking atorvastatin 10mg daily.      - CBC WITH AUTOMATED DIFF; Future  - METABOLIC PANEL, COMPREHENSIVE; Future  - LIPID PANEL; Future    4. Encounter for long-term (current) use of medications      5. Medicare annual wellness visit, subsequent  -completed today.   -[facility] decision maker reviewed with patient and updated. - DEPRESSION SCREEN ANNUAL    6.  Screening for depression    - DEPRESSION SCREEN ANNUAL     Orders Placed This Encounter    ANNUAL DEPRESSION SCREEN 8-15 MIN    CBC WITH AUTOMATED DIFF     Standing Status:   Future     Standing Expiration Date:   0/13/8994    METABOLIC PANEL, COMPREHENSIVE     Standing Status:   Future     Standing Expiration Date:   7/25/2023    LIPID PANEL     Standing Status:   Future     Standing Expiration Date:   7/25/2023    HEMOGLOBIN A1C WITH EAG     Standing Status:   Future     Standing Expiration Date:   7/25/2023    MICROALBUMIN, UR, RAND W/ MICROALB/CREAT RATIO     Standing Status:   Future     Standing Expiration Date:   7/25/2023        Follow-up Disposition:     Follow up in 6 months               Subjective:      Judy Matias is a 67 y.o. female who presents today for her Medicare Wellness Exam and follow up of her diet controlled diabetes mellitus, hypertension and hyperlipidemia     Since last visit 1/21/2022:  -has been doing a keaton-chi like exercise regularly.     -has been maintaining diabetes mellitus diet.     -had left eye cataract extraction with lens implant in 12, 2021. Will likely have have her right eye done in 12/2022. Patient Care Team:  -Dr. Jose Mcdaniels, gyn  -Dr. Chance Arevalo, colorectal surgeon - colonoscopy   -Dr. Daniel Martin, eye care       Objective: Wt Readings from Last 3 Encounters:   01/21/22 182 lb 3.2 oz (82.6 kg)   11/04/21 183 lb 12.8 oz (83.4 kg)   07/21/21 181 lb 6.4 oz (82.3 kg)     BP Readings from Last 3 Encounters:   01/21/22 136/78   11/04/21 (!) 187/77   07/21/21 (!) 148/88     Visit Vitals  /80   Pulse (!) 46   Temp 97.8 °F (36.6 °C) (Temporal)   Resp 16   Ht 5' 5\" (1.651 m)   Wt 177 lb (80.3 kg)   SpO2 95%   BMI 29.45 kg/m²     General appearance: alert, cooperative, no distress, appears stated age  Head: Normocephalic, without obvious abnormality, atraumatic  Neck: supple, symmetrical, trachea midline, no adenopathy, no carotid bruit, and no JVD  Lungs: clear to auscultation bilaterally  Heart: regular rate and rhythm, S1, S2 normal, no murmur, click, rub or gallop  Abdomen: soft, non-tender. Bowel sounds normal. No masses,  no organomegaly  Extremities: extremities normal, atraumatic, no cyanosis or edema  Pulses: 2+ and symmetric              Disclaimer:  Return if symptoms worsen or fail to improve. Advised patient to call back or return to office if symptoms worsen/change/persist.     Discussed expected course/resolution/complications of diagnosis in detail with patient. Medication risks/benefits/costs/interactions/alternatives discussed with patient. Patient was given an after visit summary which includes diagnoses, current medications, & vitals. Patient expressed understanding with the diagnosis and plan.   This is the Subsequent Medicare Annual Wellness Exam, performed 12 months or more after the Initial AWV or the last Subsequent AWV    I have reviewed the patient's medical history in detail and updated the computerized patient record. Assessment/Plan   Education and counseling provided:  Are appropriate based on today's review and evaluation    1. Medicare annual wellness visit, subsequent  -     [facility]  2. Diabetes mellitus type 2, diet-controlled ([facility] [address]  3. Benign hypertension  4. Mixed hyperlipidemia  5. Encounter for long-term (current) use of medications  6. Screening for depression  -     DEPRESSION SCREEN ANNUAL       Depression Risk Factor Screening     3 most recent PHQ Screens 7/25/2022   Little interest or pleasure in doing things Not at all   Feeling down, depressed, irritable, or hopeless Not at all   Total Score PHQ 2 0       Alcohol & Drug Abuse Risk Screen    Do you average more than 1 drink per night or more than 7 drinks a week:  No    On any one occasion in the past three months have you have had more than 3 drinks containing alcohol:  No          Functional Ability and Level of Safety    Hearing: Hearing is good. Activities of Daily Living: The home contains: handrails and grab bars  Patient does total self care      Ambulation: with no difficulty     Fall Risk:  Fall Risk Assessment, last 12 mths 7/25/2022   Able to walk? Yes   Fall in past 12 months? 0   Do you feel unsteady?  0   Are you worried about falling 0      Abuse Screen:  Patient is not abused       Cognitive Screening    Has your family/caregiver stated any concerns about your memory: no         Health Maintenance Due     Health Maintenance Due   Topic Date Due    Eye Exam Retinal or Dilated  12/02/2021    COVID-19 Vaccine (4 - Booster for Pfizer series) 02/22/2022    Lipid Screen  07/21/2022       Patient Care Team   Patient Care Team:  Zayda Jaramillo MD as PCP - General (Internal Medicine Physician)  Zayda Jaramillo MD as PCP - [facility] Empaneled Provider    History     Patient Active Problem List   Diagnosis Code    Essential hypertension I10    History of screening mammography Z92.89    Pap smear for cervical cancer screening Z12.4 Screen for colon cancer Z12.11    Diabetes mellitus, type 2 ([facility] [place] [address] E11.9    History of bone density study Z92.89    Hyperlipidemia E78.5    Routine eye exam Z01.00    Advance directive in chart Z78.9     Past Medical History:   Diagnosis Date    Pap smear for cervical cancer screening 3/22/2012    Post-menopause       Past Surgical History:   Procedure Laterality Date    HX CATARACT REMOVAL Left 12/01/2021    HX COLONOSCOPY  02/02/2018    nml per pt    HX CORNEAL TRANSPLANT Left 12/01/2021    HX WISDOM TEETH EXTRACTION Right 03/12/2018    x1 upper     Current Outpatient Medications   Medication Sig Dispense Refill    olmesartan-hydroCHLOROthiazide (BENICAR HCT) 20-12.5 mg per tablet TAKE 1 TABLET BY MOUTH EVERY DAY 90 Tablet 1    atorvastatin (LIPITOR) 10 mg tablet TAKE 1 TABLET BY MOUTH EVERY DAY 90 Tablet 1    prednisoLONE acetate (PRED FORTE) 1 % ophthalmic suspension Administer  to left eye. 4 times a day      CHOLECALCIFEROL, VITAMIN D3, (VITAMIN D3 PO) Take 2,000 Units by mouth daily. CYANOCOBALAMIN, VITAMIN B-12, (VITAMIN B-12 PO) Take  by mouth daily. Allergies   Allergen Reactions    Avocado Swelling     Tongue swelling    Cocoa Unknown (comments)     Pt states she is not allergic to Cocoa; just Coconut.      Coconut Swelling     Swelling of the tongue     Peach Itching    Peanut Swelling     Tongue swelling       Family History   Problem Relation Age of Onset    Breast Cancer Mother         29's     Social History     Tobacco Use    Smoking status: Never    Smokeless tobacco: Never   Substance Use Topics    Alcohol use: No         Guerda Ram MD

## 2022-07-25 NOTE — PATIENT INSTRUCTIONS
Medicare Wellness Visit, Female     The best way to live healthy is to have a lifestyle where you eat a well-balanced diet, exercise regularly, limit alcohol use, and quit all forms of tobacco/nicotine, if applicable. Regular preventive services are another way to keep healthy. Preventive services (vaccines, screening tests, monitoring & exams) can help personalize your care plan, which helps you manage your own care. Screening tests can find health problems at the earliest stages, when they are easiest to treat. [facility] follows the current, evidence-based guidelines published by the [facility] ([facility]) when recommending preventive services for our patients. Because we follow these guidelines, sometimes recommendations change over time as research supports it. (For example, mammograms used to be recommended annually. Even though Medicare will still pay for an annual mammogram, the newer guidelines recommend a mammogram every two years for women of average risk). Of course, you and your doctor may decide to screen more often for some diseases, based on your risk and your co-morbidities (chronic disease you are already diagnosed with). Preventive services for you include:  - Medicare offers their members a free annual wellness visit, which is time for you and your primary care provider to discuss and plan for your preventive service needs. Take advantage of this benefit every year!  -All adults over the age of 72 should receive the recommended pneumonia vaccines. Current USPSTF guidelines recommend a series of two vaccines for the best pneumonia protection.   -All adults should have a flu vaccine yearly and a tetanus vaccine every 10 years.   -All adults age 48 and older should receive the shingles vaccines (series of two vaccines).       -All adults age 38-68 who are overweight should have a diabetes screening test once every three years.   -All adults born between [birth year] and 1965 should be screened once for Hepatitis C.  -Other screening tests and preventive services for persons with diabetes include: an eye exam to screen for diabetic retinopathy, a kidney function test, a foot exam, and stricter control over your cholesterol.   -Cardiovascular screening for adults with routine risk involves an electrocardiogram (ECG) at intervals determined by your doctor.   -Colorectal cancer screenings should be done for adults age 54-65 with no increased risk factors for colorectal cancer. There are a number of acceptable methods of screening for this type of cancer. Each test has its own benefits and drawbacks. Discuss with your doctor what is most appropriate for you during your annual wellness visit. The different tests include: colonoscopy (considered the best screening method), a fecal occult blood test, a fecal DNA test, and sigmoidoscopy.    -A bone mass density test is recommended when a woman turns 65 to screen for osteoporosis. This test is only recommended one time, as a screening. Some providers will use this same test as a disease monitoring tool if you already have osteoporosis. -Breast cancer screenings are recommended every other year for women of normal risk, age 54-69.  -Cervical cancer screenings for women over age 72 are only recommended with certain risk factors.

## 2022-08-13 NOTE — PROGRESS NOTES
diabetes mellitus controlled. Cholesterol controlled. Kidney function normal. Alk phos elevated, stable.  Letter sent 8/13/2022

## 2022-10-17 ENCOUNTER — TRANSCRIBE ORDER (OUTPATIENT)
Dept: MAMMOGRAPHY | Age: 72
End: 2022-10-17

## 2022-10-17 ENCOUNTER — HOSPITAL ENCOUNTER (OUTPATIENT)
Dept: MAMMOGRAPHY | Age: 72
Discharge: HOME | End: 2022-10-17
Attending: INTERNAL MEDICINE
Payer: MEDICARE

## 2022-10-17 DIAGNOSIS — Z12.31 VISIT FOR SCREENING MAMMOGRAM: ICD-10-CM

## 2022-10-17 DIAGNOSIS — Z12.31 VISIT FOR SCREENING MAMMOGRAM: Primary | ICD-10-CM

## 2022-10-17 PROCEDURE — 77063 BREAST TOMOSYNTHESIS BI: CPT

## 2023-01-24 ENCOUNTER — OFFICE VISIT (OUTPATIENT)
Dept: INTERNAL MEDICINE CLINIC | Age: 73
End: 2023-01-24
Payer: MEDICARE

## 2023-01-24 VITALS
SYSTOLIC BLOOD PRESSURE: 138 MMHG | BODY MASS INDEX: 28.82 KG/M2 | HEIGHT: 65 IN | WEIGHT: 173 LBS | RESPIRATION RATE: 16 BRPM | HEART RATE: 80 BPM | DIASTOLIC BLOOD PRESSURE: 80 MMHG | OXYGEN SATURATION: 93 % | TEMPERATURE: 97.1 F

## 2023-01-24 DIAGNOSIS — E11.9 DIABETES MELLITUS TYPE 2, DIET-CONTROLLED (HCC): Primary | ICD-10-CM

## 2023-01-24 DIAGNOSIS — Z79.899 ENCOUNTER FOR LONG-TERM (CURRENT) USE OF MEDICATIONS: ICD-10-CM

## 2023-01-24 DIAGNOSIS — I10 BENIGN HYPERTENSION: ICD-10-CM

## 2023-01-24 DIAGNOSIS — E78.2 MIXED HYPERLIPIDEMIA: ICD-10-CM

## 2023-01-24 DIAGNOSIS — Z78.0 MENOPAUSE: ICD-10-CM

## 2023-01-24 LAB
ALBUMIN SERPL-MCNC: 3.7 G/DL (ref 3.5–5)
ALBUMIN/GLOB SERPL: 1 (ref 1.1–2.2)
ALP SERPL-CCNC: 141 U/L (ref 45–117)
ALT SERPL-CCNC: 34 U/L (ref 12–78)
ANION GAP SERPL CALC-SCNC: 4 MMOL/L (ref 5–15)
AST SERPL-CCNC: 22 U/L (ref 15–37)
BILIRUB SERPL-MCNC: 1.2 MG/DL (ref 0.2–1)
BUN SERPL-MCNC: 14 MG/DL (ref 6–20)
BUN/CREAT SERPL: 17 (ref 12–20)
CALCIUM SERPL-MCNC: 10.9 MG/DL (ref 8.5–10.1)
CHLORIDE SERPL-SCNC: 110 MMOL/L (ref 97–108)
CO2 SERPL-SCNC: 27 MMOL/L (ref 21–32)
CREAT SERPL-MCNC: 0.82 MG/DL (ref 0.55–1.02)
EST. AVERAGE GLUCOSE BLD GHB EST-MCNC: 148 MG/DL
GLOBULIN SER CALC-MCNC: 3.6 G/DL (ref 2–4)
GLUCOSE SERPL-MCNC: 130 MG/DL (ref 65–100)
HBA1C MFR BLD: 6.8 % (ref 4–5.6)
POTASSIUM SERPL-SCNC: 3.5 MMOL/L (ref 3.5–5.1)
PROT SERPL-MCNC: 7.3 G/DL (ref 6.4–8.2)
SODIUM SERPL-SCNC: 141 MMOL/L (ref 136–145)

## 2023-01-24 PROCEDURE — G0463 HOSPITAL OUTPT CLINIC VISIT: HCPCS | Performed by: INTERNAL MEDICINE

## 2023-01-24 NOTE — PROGRESS NOTES
Assessment/Plan:     1. Diabetes mellitus type 2, diet-controlled ([address]  -has been maintaining diabetes mellitus diet.  also diabetic    - METABOLIC PANEL, COMPREHENSIVE; Future  - HEMOGLOBIN A1C WITH EAG; Future  - HEMOGLOBIN A1C WITH EAG  - METABOLIC PANEL, COMPREHENSIVE    2. Benign hypertension  -controlled and stable with use of olmesartan-hctz 20-12.5mg daily.     - METABOLIC PANEL, COMPREHENSIVE; Future  - METABOLIC PANEL, COMPREHENSIVE    3. Mixed hyperlipidemia  -taking atorvastatin 10mg daily.     - METABOLIC PANEL, COMPREHENSIVE; Future  - METABOLIC PANEL, COMPREHENSIVE    4. Encounter for long-term (current) use of medications      5. Menopause  -due for osteoporosis screening.     - DEXA BONE DENSITY STUDY AXIAL; Future    Orders Placed This Encounter    DEXA BONE DENSITY STUDY AXIAL     Standing Status:   Future     Standing Expiration Date:   7/20/4085    METABOLIC PANEL, COMPREHENSIVE     Standing Status:   Future     Number of Occurrences:   1     Standing Expiration Date:   1/24/2024    HEMOGLOBIN A1C WITH EAG     Standing Status:   Future     Number of Occurrences:   1     Standing Expiration Date:   1/24/2024          Follow-up Disposition:     Follow up in 6 months               Subjective:      Eliel Claire is a 67 y.o. female who presents today for follow up of her diabetes mellitus type 2, hypertension and hyperlipidemia     Since last visit :  -has lost 10 pounds in the last year. Made dietary changes and has increased her exercise.     -eye exam - 1/18/2023. Dr. Paul Mckeon, [facility]. She reports everything is 'great'      Patient Care Team:  -Dr. Pepe Hollins, gyn  -Dr. Beto Michaud, colorectal surgeon - colonoscopy   -Dr. Jese Beck, eye care          Objective:      Wt Readings from Last 3 Encounters:   01/24/23 173 lb (78.5 kg)   07/25/22 177 lb (80.3 kg)   01/21/22 182 lb 3.2 oz (82.6 kg)     BP Readings from Last 3 Encounters:   01/24/23 138/80   07/25/22 122/80   01/21/22 136/78     Visit Vitals  /80   Pulse 80   Temp 97.1 °F (36.2 °C) (Temporal)   Resp 16   Ht 5' 5\" (1.651 m)   Wt 173 lb (78.5 kg)   SpO2 93%   BMI 28.79 kg/m²     General appearance: alert, cooperative, no distress, appears stated age  Head: Normocephalic, without obvious abnormality, atraumatic  Neck: supple, symmetrical, trachea midline, no adenopathy, no carotid bruit, and no JVD  Lungs: clear to auscultation bilaterally  Heart: regular rate and rhythm, S1, S2 normal, no murmur, click, rub or gallop  Extremities: extremities normal, atraumatic, no cyanosis or edema  Pulses: 2+ and symmetric              Disclaimer:  Return if symptoms worsen or fail to improve. Advised patient to call back or return to office if symptoms worsen/change/persist.     Discussed expected course/resolution/complications of diagnosis in detail with patient. Medication risks/benefits/costs/interactions/alternatives discussed with patient. Patient was given an after visit summary which includes diagnoses, current medications, & vitals. Patient expressed understanding with the diagnosis and plan.

## 2023-01-24 NOTE — PROGRESS NOTES
Verified name and birth date for privacy precautions. Chart reviewed in preparation for today's visit. Chief Complaint   Patient presents with    Cholesterol Problem          Health Maintenance Due   Topic    Eye Exam Retinal or Dilated     COVID-19 Vaccine (4 - Booster for Pfizer series)    Foot Exam Q1          Wt Readings from Last 3 Encounters:   01/24/23 173 lb (78.5 kg)   07/25/22 177 lb (80.3 kg)   01/21/22 182 lb 3.2 oz (82.6 kg)     Temp Readings from Last 3 Encounters:   01/24/23 97.1 °F (36.2 °C) (Temporal)   07/25/22 97.8 °F (36.6 °C) (Temporal)   01/21/22 97.3 °F (36.3 °C) (Temporal)     BP Readings from Last 3 Encounters:   01/24/23 138/80   07/25/22 122/80   01/21/22 136/78     Pulse Readings from Last 3 Encounters:   01/24/23 80   07/25/22 (!) 46   01/21/22 62         Learning Assessment:  :     Learning Assessment 1/7/2019 6/23/2017 5/13/2015 1/9/2014   PRIMARY LEARNER Patient Patient Patient Patient   HIGHEST LEVEL OF EDUCATION - PRIMARY LEARNER  2 YEARS OF COLLEGE - > 4 YEARS OF COLLEGE SOME COLLEGE   BARRIERS PRIMARY LEARNER NONE - NONE NONE   CO-LEARNER CAREGIVER - - No No   PRIMARY LANGUAGE ENGLISH ENGLISH ENGLISH ENGLISH    NEED - - No No   LEARNER PREFERENCE PRIMARY DEMONSTRATION DEMONSTRATION LISTENING OTHER (COMMENT)   LEARNING SPECIAL TOPICS - - no no   ANSWERED BY patient  patient patient patient   RELATIONSHIP SELF SELF SELF SELF   ASSESSMENT COMMENT - - - no       Depression Screening:  :     3 most recent PHQ Screens 1/24/2023   Little interest or pleasure in doing things Not at all   Feeling down, depressed, irritable, or hopeless Not at all   Total Score PHQ 2 0       Fall Risk Assessment:  :     Fall Risk Assessment, last 12 mths 1/24/2023   Able to walk? Yes   Fall in past 12 months? 0   Do you feel unsteady?  0   Are you worried about falling 0       Abuse Screening:  :     Abuse Screening Questionnaire 1/24/2023 7/25/2022 8/10/2020 1/7/2019 8/29/2018 6/23/2017 7/10/2014   Do you ever feel afraid of your partner? N N N N N N N   Are you in a relationship with someone who physically or mentally threatens you? N N N N N N N   Is it safe for you to go home?  Elinor Valdivia

## 2023-01-25 ENCOUNTER — TELEPHONE (OUTPATIENT)
Dept: INTERNAL MEDICINE CLINIC | Age: 73
End: 2023-01-25

## 2023-01-30 RX ORDER — ATORVASTATIN CALCIUM 10 MG/1
TABLET, FILM COATED ORAL
Qty: 90 TABLET | Refills: 1 | Status: SHIPPED | OUTPATIENT
Start: 2023-01-30

## 2023-02-02 ENCOUNTER — HOSPITAL ENCOUNTER (OUTPATIENT)
Dept: MAMMOGRAPHY | Age: 73
Discharge: HOME | End: 2023-02-02
Attending: INTERNAL MEDICINE
Payer: MEDICARE

## 2023-02-02 DIAGNOSIS — Z78.0 MENOPAUSE: ICD-10-CM

## 2023-02-02 PROCEDURE — 77080 DXA BONE DENSITY AXIAL: CPT

## 2023-03-06 ENCOUNTER — E-VISIT (OUTPATIENT)
Dept: INTERNAL MEDICINE CLINIC | Age: 73
End: 2023-03-06
Payer: MEDICARE

## 2023-03-06 ENCOUNTER — TELEPHONE (OUTPATIENT)
Dept: INTERNAL MEDICINE CLINIC | Age: 73
End: 2023-03-06

## 2023-03-06 DIAGNOSIS — U07.1 COVID-19: Primary | ICD-10-CM

## 2023-03-06 PROCEDURE — 1101F PT FALLS ASSESS-DOCD LE1/YR: CPT | Performed by: INTERNAL MEDICINE

## 2023-03-06 PROCEDURE — G9899 SCRN MAM PERF RSLTS DOC: HCPCS | Performed by: INTERNAL MEDICINE

## 2023-03-06 PROCEDURE — 99421 OL DIG E/M SVC 5-10 MIN: CPT | Performed by: INTERNAL MEDICINE

## 2023-03-06 NOTE — PROGRESS NOTES
HPI: as per patient provided history  - with covid.  -she started symptoms 4 days ago and tested positive today. Exam: N/A (electronic visit)  ASSESSMENT/PLAN:        Patient instructed to call the office if worsens, or fails to improve as anticipated. 5-10 minutes were spent on the digital evaluation and management of this patient.

## 2023-03-06 NOTE — TELEPHONE ENCOUNTER
----- Message from Angela Miller sent at 3/6/2023  8:28 AM EST -----  Subject: Message to Provider    QUESTIONS  Information for Provider? patient tested positive for COVID 19 on   Saturday. Sore throat, fever, stuffy nose, cough. using OTC Coricidin HBP   Cough & Cold and is feeling better but is asking for a prescription that   is given for COVID. [facility] [address]   ---------------------------------------------------------------------------  --------------  Mia Rascon DZQC  [phone]; OK to leave message on voicemail  ---------------------------------------------------------------------------  --------------  SCRIPT ANSWERS  Relationship to Patient?  Self

## 2023-03-16 RX ORDER — ATORVASTATIN CALCIUM 10 MG/1
10 TABLET, FILM COATED ORAL DAILY
Qty: 90 TABLET | Refills: 1 | Status: SHIPPED | OUTPATIENT
Start: 2023-03-16

## 2023-03-16 RX ORDER — OLMESARTAN MEDOXOMIL AND HYDROCHLOROTHIAZIDE 20/12.5 20; 12.5 MG/1; MG/1
1 TABLET ORAL DAILY
Qty: 90 TABLET | Refills: 1 | Status: SHIPPED | OUTPATIENT
Start: 2023-03-16

## 2023-03-16 NOTE — TELEPHONE ENCOUNTER
Reason for call:  Spoke with pt, she is going on  a trip and need the refill on Olmesartan and Atorvastatin.     [id number]  [phone]    Is this a new problem: yes     Date of last appointment:  1/24/2023     Can we respond via [employer]: no    Best call back number: Dorothy Dixonkatarina    [phone]

## 2023-04-22 ENCOUNTER — TRANSCRIBE ORDERS (OUTPATIENT)
Facility: HOSPITAL | Age: 73
End: 2023-04-22

## 2023-04-22 DIAGNOSIS — Z12.31 VISIT FOR SCREENING MAMMOGRAM: Primary | ICD-10-CM

## 2023-07-23 NOTE — PROGRESS NOTES
Assessment/Plan:     1. Type 2 diabetes mellitus without complication, without long-term current use of insulin (HCC)  -weight stable.  -labs today  -diet controlled     - CBC with Auto Differential; Future  - Comprehensive Metabolic Panel; Future  - Hemoglobin A1C; Future  - Microalbumin / Creatinine Urine Ratio; Future    2. Essential (primary) hypertension  -taking olmesartan-hctz 20-12.5mg daily. -well controlled. - CBC with Auto Differential; Future  - Comprehensive Metabolic Panel; Future    3. Mixed hyperlipidemia  -taking atorvastatin 10mg daily    - CBC with Auto Differential; Future  - Comprehensive Metabolic Panel; Future    4. Other long term (current) drug therapy    Orders Placed This Encounter    CBC with Auto Differential     Standing Status:   Future     Standing Expiration Date:   7/24/2024    Comprehensive Metabolic Panel     Standing Status:   Future     Standing Expiration Date:   7/24/2024    Hemoglobin A1C     Standing Status:   Future     Standing Expiration Date:   7/24/2024    Microalbumin / Creatinine Urine Ratio     Standing Status:   Future     Standing Expiration Date:   7/24/2024    atorvastatin (LIPITOR) 10 MG tablet     Sig: Take 1 tablet by mouth daily     Dispense:  90 tablet     Refill:  1    olmesartan-hydroCHLOROthiazide (BENICAR HCT) 20-12.5 MG per tablet     Sig: Take 1 tablet by mouth daily     Dispense:  90 tablet     Refill:  1             Follow-up Disposition:     Follow up in 6 months               Subjective:      Rosalinda Glass is a 75 y.o. female who presents today for follow up of her hypertension, diet controlled diabetes mellitus type 2 and hyperlipidemia     Since last visit :  -had colonoscopy with Dr. Gerber Vanegas - 2/27/2023. Patient reports follow up in 5 years and 1 polyp removed  -feeling well otherwise.   -denies any chest pain, shortness of breath, syncope, headaches, nausea/vomiting, or any bowel changes. Objective:      Wt Readings from

## 2023-07-24 ENCOUNTER — OFFICE VISIT (OUTPATIENT)
Age: 73
End: 2023-07-24
Payer: MEDICARE

## 2023-07-24 VITALS
TEMPERATURE: 97.6 F | BODY MASS INDEX: 28.99 KG/M2 | WEIGHT: 174 LBS | HEIGHT: 65 IN | RESPIRATION RATE: 18 BRPM | HEART RATE: 55 BPM | DIASTOLIC BLOOD PRESSURE: 71 MMHG | OXYGEN SATURATION: 96 % | SYSTOLIC BLOOD PRESSURE: 129 MMHG

## 2023-07-24 DIAGNOSIS — I10 ESSENTIAL (PRIMARY) HYPERTENSION: ICD-10-CM

## 2023-07-24 DIAGNOSIS — Z79.899 OTHER LONG TERM (CURRENT) DRUG THERAPY: ICD-10-CM

## 2023-07-24 DIAGNOSIS — E78.2 MIXED HYPERLIPIDEMIA: ICD-10-CM

## 2023-07-24 DIAGNOSIS — E11.9 TYPE 2 DIABETES MELLITUS WITHOUT COMPLICATION, WITHOUT LONG-TERM CURRENT USE OF INSULIN (HCC): ICD-10-CM

## 2023-07-24 DIAGNOSIS — E11.9 TYPE 2 DIABETES MELLITUS WITHOUT COMPLICATION, WITHOUT LONG-TERM CURRENT USE OF INSULIN (HCC): Primary | ICD-10-CM

## 2023-07-24 PROCEDURE — 3074F SYST BP LT 130 MM HG: CPT | Performed by: INTERNAL MEDICINE

## 2023-07-24 PROCEDURE — 1123F ACP DISCUSS/DSCN MKR DOCD: CPT | Performed by: INTERNAL MEDICINE

## 2023-07-24 PROCEDURE — 3078F DIAST BP <80 MM HG: CPT | Performed by: INTERNAL MEDICINE

## 2023-07-24 PROCEDURE — G8419 CALC BMI OUT NRM PARAM NOF/U: HCPCS | Performed by: INTERNAL MEDICINE

## 2023-07-24 PROCEDURE — G8427 DOCREV CUR MEDS BY ELIG CLIN: HCPCS | Performed by: INTERNAL MEDICINE

## 2023-07-24 PROCEDURE — 99214 OFFICE O/P EST MOD 30 MIN: CPT | Performed by: INTERNAL MEDICINE

## 2023-07-24 PROCEDURE — 2022F DILAT RTA XM EVC RTNOPTHY: CPT | Performed by: INTERNAL MEDICINE

## 2023-07-24 PROCEDURE — 3044F HG A1C LEVEL LT 7.0%: CPT | Performed by: INTERNAL MEDICINE

## 2023-07-24 PROCEDURE — 3017F COLORECTAL CA SCREEN DOC REV: CPT | Performed by: INTERNAL MEDICINE

## 2023-07-24 PROCEDURE — 1036F TOBACCO NON-USER: CPT | Performed by: INTERNAL MEDICINE

## 2023-07-24 PROCEDURE — G8399 PT W/DXA RESULTS DOCUMENT: HCPCS | Performed by: INTERNAL MEDICINE

## 2023-07-24 PROCEDURE — 1090F PRES/ABSN URINE INCON ASSESS: CPT | Performed by: INTERNAL MEDICINE

## 2023-07-24 RX ORDER — OLMESARTAN MEDOXOMIL AND HYDROCHLOROTHIAZIDE 20/12.5 20; 12.5 MG/1; MG/1
1 TABLET ORAL DAILY
Qty: 90 TABLET | Refills: 1 | Status: SHIPPED | OUTPATIENT
Start: 2023-07-24

## 2023-07-24 RX ORDER — ATORVASTATIN CALCIUM 10 MG/1
10 TABLET, FILM COATED ORAL DAILY
Qty: 90 TABLET | Refills: 1 | Status: SHIPPED | OUTPATIENT
Start: 2023-07-24

## 2023-07-24 SDOH — ECONOMIC STABILITY: HOUSING INSECURITY
IN THE LAST 12 MONTHS, WAS THERE A TIME WHEN YOU DID NOT HAVE A STEADY PLACE TO SLEEP OR SLEPT IN A SHELTER (INCLUDING NOW)?: NO

## 2023-07-24 SDOH — ECONOMIC STABILITY: FOOD INSECURITY: WITHIN THE PAST 12 MONTHS, THE FOOD YOU BOUGHT JUST DIDN'T LAST AND YOU DIDN'T HAVE MONEY TO GET MORE.: NEVER TRUE

## 2023-07-24 SDOH — ECONOMIC STABILITY: INCOME INSECURITY: HOW HARD IS IT FOR YOU TO PAY FOR THE VERY BASICS LIKE FOOD, HOUSING, MEDICAL CARE, AND HEATING?: NOT HARD AT ALL

## 2023-07-24 SDOH — ECONOMIC STABILITY: FOOD INSECURITY: WITHIN THE PAST 12 MONTHS, YOU WORRIED THAT YOUR FOOD WOULD RUN OUT BEFORE YOU GOT MONEY TO BUY MORE.: NEVER TRUE

## 2023-07-25 LAB
ALBUMIN SERPL-MCNC: 3.9 G/DL (ref 3.5–5)
ALBUMIN/GLOB SERPL: 1 (ref 1.1–2.2)
ALP SERPL-CCNC: 157 U/L (ref 45–117)
ALT SERPL-CCNC: 39 U/L (ref 12–78)
ANION GAP SERPL CALC-SCNC: 3 MMOL/L (ref 5–15)
AST SERPL-CCNC: 25 U/L (ref 15–37)
BASOPHILS # BLD: 0 K/UL (ref 0–0.1)
BASOPHILS NFR BLD: 0 % (ref 0–1)
BILIRUB SERPL-MCNC: 1.2 MG/DL (ref 0.2–1)
BUN SERPL-MCNC: 13 MG/DL (ref 6–20)
BUN/CREAT SERPL: 15 (ref 12–20)
CALCIUM SERPL-MCNC: 11.1 MG/DL (ref 8.5–10.1)
CHLORIDE SERPL-SCNC: 108 MMOL/L (ref 97–108)
CO2 SERPL-SCNC: 28 MMOL/L (ref 21–32)
CREAT SERPL-MCNC: 0.88 MG/DL (ref 0.55–1.02)
CREAT UR-MCNC: 56.1 MG/DL
DIFFERENTIAL METHOD BLD: ABNORMAL
EOSINOPHIL # BLD: 0.2 K/UL (ref 0–0.4)
EOSINOPHIL NFR BLD: 2 % (ref 0–7)
ERYTHROCYTE [DISTWIDTH] IN BLOOD BY AUTOMATED COUNT: 14.6 % (ref 11.5–14.5)
EST. AVERAGE GLUCOSE BLD GHB EST-MCNC: 137 MG/DL
GLOBULIN SER CALC-MCNC: 4.1 G/DL (ref 2–4)
GLUCOSE SERPL-MCNC: 128 MG/DL (ref 65–100)
HBA1C MFR BLD: 6.4 % (ref 4–5.6)
HCT VFR BLD AUTO: 43.6 % (ref 35–47)
HGB BLD-MCNC: 14.7 G/DL (ref 11.5–16)
IMM GRANULOCYTES # BLD AUTO: 0 K/UL (ref 0–0.04)
IMM GRANULOCYTES NFR BLD AUTO: 0 % (ref 0–0.5)
LYMPHOCYTES # BLD: 3.2 K/UL (ref 0.8–3.5)
LYMPHOCYTES NFR BLD: 43 % (ref 12–49)
MCH RBC QN AUTO: 29.5 PG (ref 26–34)
MCHC RBC AUTO-ENTMCNC: 33.7 G/DL (ref 30–36.5)
MCV RBC AUTO: 87.4 FL (ref 80–99)
MICROALBUMIN UR-MCNC: 1.09 MG/DL
MICROALBUMIN/CREAT UR-RTO: 19 MG/G (ref 0–30)
MONOCYTES # BLD: 0.4 K/UL (ref 0–1)
MONOCYTES NFR BLD: 6 % (ref 5–13)
NEUTS SEG # BLD: 3.6 K/UL (ref 1.8–8)
NEUTS SEG NFR BLD: 49 % (ref 32–75)
NRBC # BLD: 0 K/UL (ref 0–0.01)
NRBC BLD-RTO: 0 PER 100 WBC
PLATELET # BLD AUTO: 191 K/UL (ref 150–400)
PMV BLD AUTO: 11.8 FL (ref 8.9–12.9)
POTASSIUM SERPL-SCNC: 3.7 MMOL/L (ref 3.5–5.1)
PROT SERPL-MCNC: 8 G/DL (ref 6.4–8.2)
RBC # BLD AUTO: 4.99 M/UL (ref 3.8–5.2)
SODIUM SERPL-SCNC: 139 MMOL/L (ref 136–145)
WBC # BLD AUTO: 7.4 K/UL (ref 3.6–11)

## 2023-10-18 ENCOUNTER — HOSPITAL ENCOUNTER (OUTPATIENT)
Facility: HOSPITAL | Age: 73
Discharge: HOME | End: 2023-10-21
Payer: MEDICARE

## 2023-10-18 ENCOUNTER — TRANSCRIBE ORDERS (OUTPATIENT)
Facility: HOSPITAL | Age: 73
End: 2023-10-18

## 2023-10-18 VITALS — WEIGHT: 178 LBS | HEIGHT: 65 IN | BODY MASS INDEX: 29.66 KG/M2

## 2023-10-18 DIAGNOSIS — Z12.31 VISIT FOR SCREENING MAMMOGRAM: Primary | ICD-10-CM

## 2023-10-18 DIAGNOSIS — Z12.31 VISIT FOR SCREENING MAMMOGRAM: ICD-10-CM

## 2023-10-18 PROCEDURE — 77063 BREAST TOMOSYNTHESIS BI: CPT

## 2024-01-25 ENCOUNTER — OFFICE VISIT (OUTPATIENT)
Age: 74
End: 2024-01-25
Payer: MEDICARE

## 2024-01-25 VITALS
HEIGHT: 65 IN | TEMPERATURE: 97.5 F | DIASTOLIC BLOOD PRESSURE: 82 MMHG | WEIGHT: 171 LBS | SYSTOLIC BLOOD PRESSURE: 130 MMHG | HEART RATE: 57 BPM | BODY MASS INDEX: 28.49 KG/M2 | RESPIRATION RATE: 16 BRPM | OXYGEN SATURATION: 98 %

## 2024-01-25 DIAGNOSIS — E11.9 TYPE 2 DIABETES MELLITUS WITHOUT COMPLICATION, WITHOUT LONG-TERM CURRENT USE OF INSULIN (HCC): Primary | ICD-10-CM

## 2024-01-25 DIAGNOSIS — I10 ESSENTIAL (PRIMARY) HYPERTENSION: ICD-10-CM

## 2024-01-25 DIAGNOSIS — Z79.899 OTHER LONG TERM (CURRENT) DRUG THERAPY: ICD-10-CM

## 2024-01-25 DIAGNOSIS — E78.2 MIXED HYPERLIPIDEMIA: ICD-10-CM

## 2024-01-25 LAB
ALBUMIN SERPL-MCNC: 3.8 G/DL (ref 3.5–5)
ALBUMIN/GLOB SERPL: 1 (ref 1.1–2.2)
ALP SERPL-CCNC: 146 U/L (ref 45–117)
ALT SERPL-CCNC: 31 U/L (ref 12–78)
ANION GAP SERPL CALC-SCNC: 8 MMOL/L (ref 5–15)
AST SERPL-CCNC: 19 U/L (ref 15–37)
BASOPHILS # BLD: 0 K/UL (ref 0–0.1)
BASOPHILS NFR BLD: 0 % (ref 0–1)
BILIRUB SERPL-MCNC: 1.1 MG/DL (ref 0.2–1)
BUN SERPL-MCNC: 12 MG/DL (ref 6–20)
BUN/CREAT SERPL: 15 (ref 12–20)
CALCIUM SERPL-MCNC: 10.7 MG/DL (ref 8.5–10.1)
CHLORIDE SERPL-SCNC: 111 MMOL/L (ref 97–108)
CHOLEST SERPL-MCNC: 118 MG/DL
CO2 SERPL-SCNC: 25 MMOL/L (ref 21–32)
CREAT SERPL-MCNC: 0.79 MG/DL (ref 0.55–1.02)
CREAT UR-MCNC: 67.1 MG/DL
DIFFERENTIAL METHOD BLD: NORMAL
EOSINOPHIL # BLD: 0.2 K/UL (ref 0–0.4)
EOSINOPHIL NFR BLD: 2 % (ref 0–7)
ERYTHROCYTE [DISTWIDTH] IN BLOOD BY AUTOMATED COUNT: 14.5 % (ref 11.5–14.5)
EST. AVERAGE GLUCOSE BLD GHB EST-MCNC: 140 MG/DL
GLOBULIN SER CALC-MCNC: 3.8 G/DL (ref 2–4)
GLUCOSE SERPL-MCNC: 124 MG/DL (ref 65–100)
HBA1C MFR BLD: 6.5 % (ref 4–5.6)
HCT VFR BLD AUTO: 42.9 % (ref 35–47)
HDLC SERPL-MCNC: 50 MG/DL
HDLC SERPL: 2.4 (ref 0–5)
HGB BLD-MCNC: 14.8 G/DL (ref 11.5–16)
IMM GRANULOCYTES # BLD AUTO: 0 K/UL (ref 0–0.04)
IMM GRANULOCYTES NFR BLD AUTO: 0 % (ref 0–0.5)
LDLC SERPL CALC-MCNC: 53.6 MG/DL (ref 0–100)
LYMPHOCYTES # BLD: 3.2 K/UL (ref 0.8–3.5)
LYMPHOCYTES NFR BLD: 43 % (ref 12–49)
MCH RBC QN AUTO: 29.4 PG (ref 26–34)
MCHC RBC AUTO-ENTMCNC: 34.5 G/DL (ref 30–36.5)
MCV RBC AUTO: 85.3 FL (ref 80–99)
MICROALBUMIN UR-MCNC: 0.76 MG/DL
MICROALBUMIN/CREAT UR-RTO: 11 MG/G (ref 0–30)
MONOCYTES # BLD: 0.4 K/UL (ref 0–1)
MONOCYTES NFR BLD: 6 % (ref 5–13)
NEUTS SEG # BLD: 3.6 K/UL (ref 1.8–8)
NEUTS SEG NFR BLD: 49 % (ref 32–75)
NRBC # BLD: 0 K/UL (ref 0–0.01)
NRBC BLD-RTO: 0 PER 100 WBC
PLATELET # BLD AUTO: 201 K/UL (ref 150–400)
PMV BLD AUTO: 11.6 FL (ref 8.9–12.9)
POTASSIUM SERPL-SCNC: 3.5 MMOL/L (ref 3.5–5.1)
PROT SERPL-MCNC: 7.6 G/DL (ref 6.4–8.2)
RBC # BLD AUTO: 5.03 M/UL (ref 3.8–5.2)
SODIUM SERPL-SCNC: 144 MMOL/L (ref 136–145)
TRIGL SERPL-MCNC: 72 MG/DL
VLDLC SERPL CALC-MCNC: 14.4 MG/DL
WBC # BLD AUTO: 7.4 K/UL (ref 3.6–11)

## 2024-01-25 PROCEDURE — 99214 OFFICE O/P EST MOD 30 MIN: CPT | Performed by: INTERNAL MEDICINE

## 2024-01-25 PROCEDURE — G8484 FLU IMMUNIZE NO ADMIN: HCPCS | Performed by: INTERNAL MEDICINE

## 2024-01-25 PROCEDURE — 1123F ACP DISCUSS/DSCN MKR DOCD: CPT | Performed by: INTERNAL MEDICINE

## 2024-01-25 PROCEDURE — 1036F TOBACCO NON-USER: CPT | Performed by: INTERNAL MEDICINE

## 2024-01-25 PROCEDURE — 3017F COLORECTAL CA SCREEN DOC REV: CPT | Performed by: INTERNAL MEDICINE

## 2024-01-25 PROCEDURE — 3079F DIAST BP 80-89 MM HG: CPT | Performed by: INTERNAL MEDICINE

## 2024-01-25 PROCEDURE — 1090F PRES/ABSN URINE INCON ASSESS: CPT | Performed by: INTERNAL MEDICINE

## 2024-01-25 PROCEDURE — 2022F DILAT RTA XM EVC RTNOPTHY: CPT | Performed by: INTERNAL MEDICINE

## 2024-01-25 PROCEDURE — G8399 PT W/DXA RESULTS DOCUMENT: HCPCS | Performed by: INTERNAL MEDICINE

## 2024-01-25 PROCEDURE — G8419 CALC BMI OUT NRM PARAM NOF/U: HCPCS | Performed by: INTERNAL MEDICINE

## 2024-01-25 PROCEDURE — 3044F HG A1C LEVEL LT 7.0%: CPT | Performed by: INTERNAL MEDICINE

## 2024-01-25 PROCEDURE — 3075F SYST BP GE 130 - 139MM HG: CPT | Performed by: INTERNAL MEDICINE

## 2024-01-25 PROCEDURE — G8428 CUR MEDS NOT DOCUMENT: HCPCS | Performed by: INTERNAL MEDICINE

## 2024-01-25 ASSESSMENT — PATIENT HEALTH QUESTIONNAIRE - PHQ9
SUM OF ALL RESPONSES TO PHQ9 QUESTIONS 1 & 2: 0
SUM OF ALL RESPONSES TO PHQ QUESTIONS 1-9: 0
2. FEELING DOWN, DEPRESSED OR HOPELESS: 0
1. LITTLE INTEREST OR PLEASURE IN DOING THINGS: 0
SUM OF ALL RESPONSES TO PHQ QUESTIONS 1-9: 0

## 2024-01-25 NOTE — PROGRESS NOTES
Assessment/Plan:     1. Type 2 diabetes mellitus without complication, without long-term current use of insulin (HCC)  -compliant with diabetes mellitus diet.   -weight stable  -diabetes mellitus is diet controlled.   -will request last eye exam done at [facility] [place]    - CBC with Auto Differential; Future  - Comprehensive Metabolic Panel; Future  - Hemoglobin A1C; Future  - Lipid Panel; Future  - Microalbumin / Creatinine Urine Ratio; Future  - Microalbumin / Creatinine Urine Ratio  - Lipid Panel  - Hemoglobin A1C  - Comprehensive Metabolic Panel  - CBC with Auto Differential    2. Essential (primary) hypertension  -taking olmesartan/hctz 20-12.5mg daily.  Controlled.   No adjustments today    - CBC with Auto Differential; Future  - Comprehensive Metabolic Panel; Future  - Comprehensive Metabolic Panel  - CBC with Auto Differential    3. Mixed hyperlipidemia  -taking atorvastatin 10mg daily.   -need fasting lipid panel today    - CBC with Auto Differential; Future  - Comprehensive Metabolic Panel; Future  - Lipid Panel; Future  - Lipid Panel  - Comprehensive Metabolic Panel  - CBC with Auto Differential    4. Other long term (current) drug therapy    Orders Placed This Encounter    CBC with Auto Differential     Standing Status:   Future     Number of Occurrences:   1     Standing Expiration Date:   1/25/2025    Comprehensive Metabolic Panel     Standing Status:   Future     Number of Occurrences:   1     Standing Expiration Date:   1/25/2025    Hemoglobin A1C     Standing Status:   Future     Number of Occurrences:   1     Standing Expiration Date:   1/25/2025    Lipid Panel     Standing Status:   Future     Number of Occurrences:   1     Standing Expiration Date:   1/25/2025     Order Specific Question:   Is Patient Fasting?/# of Hours     Answer:   8     Order Specific Question:   Has the patient fasted?     Answer:   Yes    Microalbumin / Creatinine Urine Ratio     Standing Status:   Future     Number of Occurrences:   1

## 2024-01-25 NOTE — PROGRESS NOTES
Chief Complaint   Patient presents with    Follow-up    eviewed record in preparation for visit and have obtained necessary documentation.    Identified pt with two pt identifiers(name and ).      Health Maintenance Due   Topic    Shingles vaccine (1 of 2)    Respiratory Syncytial Virus (RSV) Pregnant or age 60 yrs+ (1 - 1-dose 60+ series)    Diabetic retinal exam     Diabetic foot exam     Lipids     COVID-19 Vaccine ( season)    Annual Wellness Visit (Medicare)     Depression Screen          Chief Complaint   Patient presents with    Follow-up        Wt Readings from Last 3 Encounters:   24 77.6 kg (171 lb)   10/18/23 80.7 kg (178 lb)   23 78.9 kg (174 lb)     Temp Readings from Last 3 Encounters:   24 97.5 °F (36.4 °C) (Temporal)   23 97.6 °F (36.4 °C) (Temporal)     BP Readings from Last 3 Encounters:   24 130/82   23 129/71   23 138/80     Pulse Readings from Last 3 Encounters:   24 57   23 55   23 80           Learning Assessment:  :        Depression Screening:  :         No data to display                Fall Risk Assessment:  :        Abuse Screening:  :         No data to display                Coordination of Care Questionnaire:  :    1) Have you been to an emergency room, urgent care clinic since your last visit? no   Hospitalized since your last visit? no             2) Have you seen or consulted any other health care providers outside of [facility] since your last visit? no  (Include any pap smears or colon screenings in this section.)    3) Do you have an Advance Directive on file? no    4) Are you interested in receiving information on Advance Directives? No

## 2024-03-08 RX ORDER — OLMESARTAN MEDOXOMIL AND HYDROCHLOROTHIAZIDE 20/12.5 20; 12.5 MG/1; MG/1
1 TABLET ORAL DAILY
Qty: 90 TABLET | Refills: 0 | Status: SHIPPED | OUTPATIENT
Start: 2024-03-08

## 2024-03-08 NOTE — TELEPHONE ENCOUNTER
Pt last seen 01/25/2024    Has appt on 08/12/2024        Rx sent to pharmacy as previously filled and verified by Verbal Order Read Back with provider.     4 = No assist / stand by assistance

## 2024-03-29 RX ORDER — ATORVASTATIN CALCIUM 10 MG/1
10 TABLET, FILM COATED ORAL DAILY
Qty: 90 TABLET | Refills: 1 | Status: SHIPPED | OUTPATIENT
Start: 2024-03-29

## 2024-03-29 NOTE — TELEPHONE ENCOUNTER
Rx sent to pharmacy as previously filled and verified by Verbal Order Read Back with provider.

## 2024-07-12 DIAGNOSIS — I10 ESSENTIAL HYPERTENSION: Primary | ICD-10-CM

## 2024-07-12 RX ORDER — OLMESARTAN MEDOXOMIL AND HYDROCHLOROTHIAZIDE 20/12.5 20; 12.5 MG/1; MG/1
1 TABLET ORAL DAILY
Qty: 90 TABLET | Refills: 0 | Status: SHIPPED | OUTPATIENT
Start: 2024-07-12

## 2024-07-12 RX ORDER — ATORVASTATIN CALCIUM 10 MG/1
10 TABLET, FILM COATED ORAL DAILY
Qty: 90 TABLET | Refills: 1 | OUTPATIENT
Start: 2024-07-12

## 2024-08-10 NOTE — PROGRESS NOTES
Do Mari is a 74 y.o. female Established patient who presents today for evaluation of the following            Assessment & Plan  1. Diet-controlled diabetes.  She has incorporated daily exercise into her routine, has lost a couple more pounds, and has been maintaining a diabetic diet. Labs will be checked today to monitor her condition.    2. Hypertension.  Blood pressure is very well controlled with the use of olmesartan-hydrochlorothiazide 20/12.5 mg, 1 tablet daily. She does not monitor her blood pressures at home. No adjustments will be made today.    3. Thickened toenails.  Suspect onychomycosis. Referred to podiatry for assistance in clipping her nails as they are causing some discomfort within her shoes. She was advised that over-the-counter treatments are generally ineffective for this condition.    4. Medication Management.  Refills for blood pressure and cholesterol medications will be sent to the [facility].      5. Advanced Care Planning.  Her living will was reviewed and confirmed to be up-to-date. Her  is listed as the primary contact, and her children are secondary contacts. She expressed a preference for [facility] but was informed that in emergencies, the nearest available hospital may be used.    Follow-up  The patient will follow up in 6 months for a Medicare wellness exam.                              History of Present Illness  The patient presents for evaluation of multiple medical concerns.    She reports no current health complaints. She had been experiencing some soreness, but this has improved since using a special pillow. She maintains a daily exercise routine, including walking, neck exercises, and stretching. She has reduced her weightlifting from 5-6 pounds to 2-3 pounds due to perceived muscle weakness. She is supplementing her diet with extra protein shakes and has lost a few pounds. Her exercise routine includes a 10-minute session and a

## 2024-08-11 SDOH — ECONOMIC STABILITY: FOOD INSECURITY: WITHIN THE PAST 12 MONTHS, YOU WORRIED THAT YOUR FOOD WOULD RUN OUT BEFORE YOU GOT MONEY TO BUY MORE.: NEVER TRUE

## 2024-08-11 SDOH — ECONOMIC STABILITY: FOOD INSECURITY: WITHIN THE PAST 12 MONTHS, THE FOOD YOU BOUGHT JUST DIDN'T LAST AND YOU DIDN'T HAVE MONEY TO GET MORE.: NEVER TRUE

## 2024-08-11 SDOH — ECONOMIC STABILITY: TRANSPORTATION INSECURITY
IN THE PAST 12 MONTHS, HAS LACK OF TRANSPORTATION KEPT YOU FROM MEETINGS, WORK, OR FROM GETTING THINGS NEEDED FOR DAILY LIVING?: NO

## 2024-08-11 SDOH — ECONOMIC STABILITY: INCOME INSECURITY: HOW HARD IS IT FOR YOU TO PAY FOR THE VERY BASICS LIKE FOOD, HOUSING, MEDICAL CARE, AND HEATING?: NOT HARD AT ALL

## 2024-08-12 ENCOUNTER — OFFICE VISIT (OUTPATIENT)
Age: 74
End: 2024-08-12
Payer: MEDICARE

## 2024-08-12 VITALS
DIASTOLIC BLOOD PRESSURE: 80 MMHG | HEART RATE: 83 BPM | WEIGHT: 168.4 LBS | SYSTOLIC BLOOD PRESSURE: 130 MMHG | BODY MASS INDEX: 28.06 KG/M2 | OXYGEN SATURATION: 96 % | HEIGHT: 65 IN | TEMPERATURE: 97.8 F | RESPIRATION RATE: 16 BRPM

## 2024-08-12 DIAGNOSIS — E11.9 TYPE 2 DIABETES MELLITUS WITHOUT COMPLICATION, WITHOUT LONG-TERM CURRENT USE OF INSULIN (HCC): Primary | ICD-10-CM

## 2024-08-12 DIAGNOSIS — I10 ESSENTIAL HYPERTENSION: ICD-10-CM

## 2024-08-12 DIAGNOSIS — E11.9 TYPE 2 DIABETES MELLITUS WITHOUT COMPLICATION, WITHOUT LONG-TERM CURRENT USE OF INSULIN (HCC): ICD-10-CM

## 2024-08-12 DIAGNOSIS — Z79.899 OTHER LONG TERM (CURRENT) DRUG THERAPY: ICD-10-CM

## 2024-08-12 DIAGNOSIS — B35.1 ONYCHOMYCOSIS: ICD-10-CM

## 2024-08-12 LAB
ALBUMIN SERPL-MCNC: 3.9 G/DL (ref 3.5–5)
ALBUMIN/GLOB SERPL: 1.1 (ref 1.1–2.2)
ALP SERPL-CCNC: 155 U/L (ref 45–117)
ALT SERPL-CCNC: 30 U/L (ref 12–78)
ANION GAP SERPL CALC-SCNC: 5 MMOL/L (ref 5–15)
AST SERPL-CCNC: 23 U/L (ref 15–37)
BILIRUB SERPL-MCNC: 1.7 MG/DL (ref 0.2–1)
BUN SERPL-MCNC: 12 MG/DL (ref 6–20)
BUN/CREAT SERPL: 15 (ref 12–20)
CALCIUM SERPL-MCNC: 11.5 MG/DL (ref 8.5–10.1)
CHLORIDE SERPL-SCNC: 107 MMOL/L (ref 97–108)
CO2 SERPL-SCNC: 29 MMOL/L (ref 21–32)
CREAT SERPL-MCNC: 0.82 MG/DL (ref 0.55–1.02)
EST. AVERAGE GLUCOSE BLD GHB EST-MCNC: 143 MG/DL
GLOBULIN SER CALC-MCNC: 3.5 G/DL (ref 2–4)
GLUCOSE SERPL-MCNC: 139 MG/DL (ref 65–100)
HBA1C MFR BLD: 6.6 % (ref 4–5.6)
POTASSIUM SERPL-SCNC: 3.8 MMOL/L (ref 3.5–5.1)
PROT SERPL-MCNC: 7.4 G/DL (ref 6.4–8.2)
SODIUM SERPL-SCNC: 141 MMOL/L (ref 136–145)

## 2024-08-12 PROCEDURE — 2022F DILAT RTA XM EVC RTNOPTHY: CPT | Performed by: INTERNAL MEDICINE

## 2024-08-12 PROCEDURE — G8427 DOCREV CUR MEDS BY ELIG CLIN: HCPCS | Performed by: INTERNAL MEDICINE

## 2024-08-12 PROCEDURE — G8399 PT W/DXA RESULTS DOCUMENT: HCPCS | Performed by: INTERNAL MEDICINE

## 2024-08-12 PROCEDURE — 3017F COLORECTAL CA SCREEN DOC REV: CPT | Performed by: INTERNAL MEDICINE

## 2024-08-12 PROCEDURE — 99214 OFFICE O/P EST MOD 30 MIN: CPT | Performed by: INTERNAL MEDICINE

## 2024-08-12 PROCEDURE — 3044F HG A1C LEVEL LT 7.0%: CPT | Performed by: INTERNAL MEDICINE

## 2024-08-12 PROCEDURE — 3079F DIAST BP 80-89 MM HG: CPT | Performed by: INTERNAL MEDICINE

## 2024-08-12 PROCEDURE — 1036F TOBACCO NON-USER: CPT | Performed by: INTERNAL MEDICINE

## 2024-08-12 PROCEDURE — 1090F PRES/ABSN URINE INCON ASSESS: CPT | Performed by: INTERNAL MEDICINE

## 2024-08-12 PROCEDURE — 3075F SYST BP GE 130 - 139MM HG: CPT | Performed by: INTERNAL MEDICINE

## 2024-08-12 PROCEDURE — G8419 CALC BMI OUT NRM PARAM NOF/U: HCPCS | Performed by: INTERNAL MEDICINE

## 2024-08-12 PROCEDURE — 1123F ACP DISCUSS/DSCN MKR DOCD: CPT | Performed by: INTERNAL MEDICINE

## 2024-08-12 RX ORDER — ATORVASTATIN CALCIUM 10 MG/1
10 TABLET, FILM COATED ORAL DAILY
Qty: 90 TABLET | Refills: 1 | Status: SHIPPED | OUTPATIENT
Start: 2024-08-12

## 2024-08-12 RX ORDER — OLMESARTAN MEDOXOMIL AND HYDROCHLOROTHIAZIDE 20/12.5 20; 12.5 MG/1; MG/1
1 TABLET ORAL DAILY
Qty: 90 TABLET | Refills: 1 | Status: SHIPPED | OUTPATIENT
Start: 2024-08-12

## 2024-08-12 NOTE — PROGRESS NOTES
Chief Complaint   Patient presents with    Follow-up     eviewed record in preparation for visit and have obtained necessary documentation.    Identified pt with two pt identifiers(name and ).      Health Maintenance Due   Topic    Shingles vaccine (1 of 2)    Respiratory Syncytial Virus (RSV) Pregnant or age 60 yrs+ (1 - 1-dose 60+ series)    Diabetic retinal exam     Diabetic foot exam     COVID-19 Vaccine ( season)    Annual Wellness Visit (Medicare)     Flu vaccine (1)         Chief Complaint   Patient presents with    Follow-up        Wt Readings from Last 3 Encounters:   24 77.6 kg (171 lb)   10/18/23 80.7 kg (178 lb)   23 78.9 kg (174 lb)     Temp Readings from Last 3 Encounters:   24 97.5 °F (36.4 °C) (Temporal)   23 97.6 °F (36.4 °C) (Temporal)     BP Readings from Last 3 Encounters:   24 130/82   23 129/71   23 138/80     Pulse Readings from Last 3 Encounters:   24 57   23 55   23 80           Learning Assessment:  :    Failed to redirect to the Timeline version of the [employer] SmartLink.    Depression Screening:  :         No data to display                Fall Risk Assessment:  :    Failed to redirect to the Timeline version of the RE[employer]FS SmartLink.    Abuse Screening:  :         No data to display                Coordination of Care Questionnaire:  :    1) Have you been to an emergency room, urgent care clinic since your last visit? no   Hospitalized since your last visit? no             2) Have you seen or consulted any other health care providers outside of [facility] since your last visit? no  (Include any pap smears or colon screenings in this section.)    3) Do you have an Advance Directive on file? no    4) Are you interested in receiving information on Advance Directives? No

## 2024-08-13 ENCOUNTER — TELEPHONE (OUTPATIENT)
Age: 74
End: 2024-08-13

## 2024-08-13 DIAGNOSIS — R74.8 ELEVATED ALKALINE PHOSPHATASE LEVEL: Primary | ICD-10-CM

## 2024-08-13 DIAGNOSIS — R17 ELEVATED BILIRUBIN: ICD-10-CM

## 2024-08-13 DIAGNOSIS — E11.9 TYPE 2 DIABETES MELLITUS WITHOUT COMPLICATION, WITHOUT LONG-TERM CURRENT USE OF INSULIN (HCC): ICD-10-CM

## 2024-08-13 DIAGNOSIS — R94.5 ABNORMAL LIVER FUNCTION: ICD-10-CM

## 2024-08-13 DIAGNOSIS — E83.52 SERUM CALCIUM ELEVATED: ICD-10-CM

## 2024-08-13 DIAGNOSIS — Z11.59 ENCOUNTER FOR SCREENING FOR OTHER VIRAL DISEASES: ICD-10-CM

## 2024-08-13 NOTE — TELEPHONE ENCOUNTER
8/13/2024 spoke with patient.     -discussed rising alk phos, bilirubin and calcium.     Not on any medications or supplements to cause this.    -will order additional labs and liver ultrasound.     Orders Placed This Encounter    US ABDOMEN LIMITED           Standing Status:   Future     Standing Expiration Date:   8/13/2025     Order Specific Question:   Reason for exam:     Answer:   elevated alk phos and bilirubin     Order Specific Question:   Specify organ?     Answer:   LIVER    TSH     Standing Status:   Future     Standing Expiration Date:   8/13/2025    Vitamin D 25 Hydroxy     Standing Status:   Future     Standing Expiration Date:   8/13/2025    Hepatitis High-Risk     Standing Status:   Future     Standing Expiration Date:   8/13/2025    Gamma GT     Standing Status:   Future     Standing Expiration Date:   8/13/2025    PTH, Intact     Standing Status:   Future     Standing Expiration Date:   8/13/2025

## 2024-08-15 DIAGNOSIS — R74.8 ELEVATED ALKALINE PHOSPHATASE LEVEL: ICD-10-CM

## 2024-08-15 DIAGNOSIS — E11.9 TYPE 2 DIABETES MELLITUS WITHOUT COMPLICATION, WITHOUT LONG-TERM CURRENT USE OF INSULIN (HCC): ICD-10-CM

## 2024-08-15 DIAGNOSIS — R94.5 ABNORMAL LIVER FUNCTION: ICD-10-CM

## 2024-08-15 DIAGNOSIS — Z11.59 ENCOUNTER FOR SCREENING FOR OTHER VIRAL DISEASES: ICD-10-CM

## 2024-08-15 DIAGNOSIS — R17 ELEVATED BILIRUBIN: ICD-10-CM

## 2024-08-15 DIAGNOSIS — E83.52 SERUM CALCIUM ELEVATED: ICD-10-CM

## 2024-08-16 ENCOUNTER — HOSPITAL ENCOUNTER (OUTPATIENT)
Facility: HOSPITAL | Age: 74
End: 2024-08-16
Attending: INTERNAL MEDICINE
Payer: MEDICARE

## 2024-08-16 DIAGNOSIS — R74.8 ELEVATED ALKALINE PHOSPHATASE LEVEL: ICD-10-CM

## 2024-08-16 DIAGNOSIS — R94.5 ABNORMAL LIVER FUNCTION: ICD-10-CM

## 2024-08-16 DIAGNOSIS — R17 ELEVATED BILIRUBIN: ICD-10-CM

## 2024-08-16 LAB
25(OH)D3 SERPL-MCNC: 36.2 NG/ML (ref 30–100)
CALCIUM SERPL-MCNC: 11.4 MG/DL (ref 8.5–10.1)
GGT SERPL-CCNC: 35 U/L (ref 5–55)
HBV CORE AB SERPL QL IA: NEGATIVE
HBV SURFACE AB SER QL: NONREACTIVE
HBV SURFACE AB SER-ACNC: 4.21 MIU/ML
HBV SURFACE AG SER QL: 0.16 INDEX
HBV SURFACE AG SER QL: NEGATIVE
PTH-INTACT SERPL-MCNC: 103.4 PG/ML (ref 18.4–88)
TSH SERPL DL<=0.05 MIU/L-ACNC: 0.41 UIU/ML (ref 0.36–3.74)

## 2024-08-16 PROCEDURE — 76705 ECHO EXAM OF ABDOMEN: CPT

## 2024-08-16 RX ORDER — UREA 10 %
500 LOTION (ML) TOPICAL DAILY
COMMUNITY

## 2024-08-26 ENCOUNTER — TELEPHONE (OUTPATIENT)
Age: 74
End: 2024-08-26

## 2024-08-26 RX ORDER — OLMESARTAN MEDOXOMIL 40 MG/1
40 TABLET ORAL DAILY
Qty: 90 TABLET | Refills: 0 | Status: SHIPPED | OUTPATIENT
Start: 2024-08-26

## 2024-08-26 NOTE — TELEPHONE ENCOUNTER
8/26/2024     -spoke with patient.     -PTH and calcium elevated - possible due to her use of hctz.  Will change her olmesartan-hctz 20-12.5mg daily to just olmesartan 40mg daily.  Asked her to follow up in 4 weeks.  Will check bp and also calcium and PTH levels at that time.    -discussed results of her liver ultrasound showing hepatic steatosis.  Asked patient to work on reducing fats in diet.  She does not consume alcohol    Patient stated understanding of the instructions and plan for medical care as outlined.

## 2024-09-18 ENCOUNTER — OFFICE VISIT (OUTPATIENT)
Age: 74
End: 2024-09-18
Payer: MEDICARE

## 2024-09-18 VITALS
HEART RATE: 56 BPM | HEIGHT: 65 IN | BODY MASS INDEX: 27.63 KG/M2 | OXYGEN SATURATION: 97 % | DIASTOLIC BLOOD PRESSURE: 70 MMHG | WEIGHT: 165.8 LBS | RESPIRATION RATE: 16 BRPM | TEMPERATURE: 97.3 F | SYSTOLIC BLOOD PRESSURE: 160 MMHG

## 2024-09-18 DIAGNOSIS — I10 ESSENTIAL HYPERTENSION: ICD-10-CM

## 2024-09-18 DIAGNOSIS — I10 ESSENTIAL HYPERTENSION: Primary | ICD-10-CM

## 2024-09-18 DIAGNOSIS — E83.52 SERUM CALCIUM ELEVATED: ICD-10-CM

## 2024-09-18 PROCEDURE — 1090F PRES/ABSN URINE INCON ASSESS: CPT | Performed by: INTERNAL MEDICINE

## 2024-09-18 PROCEDURE — 99213 OFFICE O/P EST LOW 20 MIN: CPT | Performed by: INTERNAL MEDICINE

## 2024-09-18 PROCEDURE — 1036F TOBACCO NON-USER: CPT | Performed by: INTERNAL MEDICINE

## 2024-09-18 PROCEDURE — 1123F ACP DISCUSS/DSCN MKR DOCD: CPT | Performed by: INTERNAL MEDICINE

## 2024-09-18 PROCEDURE — G8427 DOCREV CUR MEDS BY ELIG CLIN: HCPCS | Performed by: INTERNAL MEDICINE

## 2024-09-18 PROCEDURE — 3017F COLORECTAL CA SCREEN DOC REV: CPT | Performed by: INTERNAL MEDICINE

## 2024-09-18 PROCEDURE — 3078F DIAST BP <80 MM HG: CPT | Performed by: INTERNAL MEDICINE

## 2024-09-18 PROCEDURE — 3077F SYST BP >= 140 MM HG: CPT | Performed by: INTERNAL MEDICINE

## 2024-09-18 PROCEDURE — G8419 CALC BMI OUT NRM PARAM NOF/U: HCPCS | Performed by: INTERNAL MEDICINE

## 2024-09-18 PROCEDURE — G8399 PT W/DXA RESULTS DOCUMENT: HCPCS | Performed by: INTERNAL MEDICINE

## 2024-09-19 LAB
ALBUMIN SERPL-MCNC: 3.7 G/DL (ref 3.5–5)
ALBUMIN/GLOB SERPL: 1 (ref 1.1–2.2)
ALP SERPL-CCNC: 157 U/L (ref 45–117)
ALT SERPL-CCNC: 28 U/L (ref 12–78)
ANION GAP SERPL CALC-SCNC: 3 MMOL/L (ref 2–12)
AST SERPL-CCNC: 15 U/L (ref 15–37)
BILIRUB SERPL-MCNC: 1.3 MG/DL (ref 0.2–1)
BUN SERPL-MCNC: 12 MG/DL (ref 6–20)
BUN/CREAT SERPL: 16 (ref 12–20)
CALCIUM SERPL-MCNC: 11.2 MG/DL (ref 8.5–10.1)
CALCIUM SERPL-MCNC: 11.6 MG/DL (ref 8.5–10.1)
CHLORIDE SERPL-SCNC: 113 MMOL/L (ref 97–108)
CO2 SERPL-SCNC: 28 MMOL/L (ref 21–32)
CREAT SERPL-MCNC: 0.75 MG/DL (ref 0.55–1.02)
GLOBULIN SER CALC-MCNC: 3.7 G/DL (ref 2–4)
GLUCOSE SERPL-MCNC: 113 MG/DL (ref 65–100)
POTASSIUM SERPL-SCNC: 4 MMOL/L (ref 3.5–5.1)
PROT SERPL-MCNC: 7.4 G/DL (ref 6.4–8.2)
PTH-INTACT SERPL-MCNC: 103.4 PG/ML (ref 18.4–88)
SODIUM SERPL-SCNC: 144 MMOL/L (ref 136–145)

## 2024-10-01 ENCOUNTER — OFFICE VISIT (OUTPATIENT)
Age: 74
End: 2024-10-01
Payer: MEDICARE

## 2024-10-01 VITALS
SYSTOLIC BLOOD PRESSURE: 132 MMHG | HEART RATE: 63 BPM | HEIGHT: 65 IN | BODY MASS INDEX: 27.63 KG/M2 | RESPIRATION RATE: 16 BRPM | WEIGHT: 165.8 LBS | OXYGEN SATURATION: 96 % | TEMPERATURE: 97.5 F | DIASTOLIC BLOOD PRESSURE: 84 MMHG

## 2024-10-01 DIAGNOSIS — R79.89 ELEVATED PARATHYROID HORMONE: ICD-10-CM

## 2024-10-01 DIAGNOSIS — I10 ESSENTIAL HYPERTENSION: Primary | ICD-10-CM

## 2024-10-01 PROCEDURE — G8427 DOCREV CUR MEDS BY ELIG CLIN: HCPCS | Performed by: INTERNAL MEDICINE

## 2024-10-01 PROCEDURE — 1036F TOBACCO NON-USER: CPT | Performed by: INTERNAL MEDICINE

## 2024-10-01 PROCEDURE — 99213 OFFICE O/P EST LOW 20 MIN: CPT | Performed by: INTERNAL MEDICINE

## 2024-10-01 PROCEDURE — G8419 CALC BMI OUT NRM PARAM NOF/U: HCPCS | Performed by: INTERNAL MEDICINE

## 2024-10-01 PROCEDURE — G8484 FLU IMMUNIZE NO ADMIN: HCPCS | Performed by: INTERNAL MEDICINE

## 2024-10-01 PROCEDURE — 1090F PRES/ABSN URINE INCON ASSESS: CPT | Performed by: INTERNAL MEDICINE

## 2024-10-01 PROCEDURE — 3079F DIAST BP 80-89 MM HG: CPT | Performed by: INTERNAL MEDICINE

## 2024-10-01 PROCEDURE — 1123F ACP DISCUSS/DSCN MKR DOCD: CPT | Performed by: INTERNAL MEDICINE

## 2024-10-01 PROCEDURE — 3017F COLORECTAL CA SCREEN DOC REV: CPT | Performed by: INTERNAL MEDICINE

## 2024-10-01 PROCEDURE — G8399 PT W/DXA RESULTS DOCUMENT: HCPCS | Performed by: INTERNAL MEDICINE

## 2024-10-01 PROCEDURE — 3075F SYST BP GE 130 - 139MM HG: CPT | Performed by: INTERNAL MEDICINE

## 2024-10-01 NOTE — PROGRESS NOTES
Chief Complaint   Patient presents with    Hypertension     eviewed record in preparation for visit and have obtained necessary documentation.    Identified pt with two pt identifiers(name and ).      Health Maintenance Due   Topic    Shingles vaccine (1 of 2)    Respiratory Syncytial Virus (RSV) Pregnant or age 60 yrs+ (1 - 1-dose 60+ series)    Diabetic retinal exam     Diabetic foot exam     Annual Wellness Visit (Medicare)     Flu vaccine (1)    COVID-19 Vaccine ( season)         Chief Complaint   Patient presents with    Hypertension        Wt Readings from Last 3 Encounters:   10/01/24 75.2 kg (165 lb 12.8 oz)   24 75.2 kg (165 lb 12.8 oz)   24 76.4 kg (168 lb 6.4 oz)     Temp Readings from Last 3 Encounters:   10/01/24 97.5 °F (36.4 °C) (Temporal)   24 97.3 °F (36.3 °C) (Temporal)   24 97.8 °F (36.6 °C) (Temporal)     BP Readings from Last 3 Encounters:   10/01/24 132/84   24 (!) 160/70   24 130/80     Pulse Readings from Last 3 Encounters:   10/01/24 63   24 56   24 83           Learning Assessment:  :    Failed to redirect to the Timeline version of the [employer]FS SmartLink.    Depression Screening:  :         No data to display                Fall Risk Assessment:  :    Failed to redirect to the Timeline version of the RE[employer]FS SmartLink.    Abuse Screening:  :         No data to display               
abnormality, atraumatic  Lungs: clear to auscultation bilaterally  Heart: regular rate and rhythm, S1, S2 normal, no murmur, click, rub or gallop            The patient (or guardian, if applicable) and other individuals in attendance with the patient were advised that Artificial Intelligence will be utilized during this visit to record and process the conversation to generate a clinical note. The patient (or guardian, if applicable) and other individuals in attendance at the appointment consented to the use of AI, including the recording.

## 2024-10-09 RX ORDER — OLMESARTAN MEDOXOMIL 40 MG/1
40 TABLET ORAL DAILY
Qty: 90 TABLET | Refills: 0 | Status: SHIPPED | OUTPATIENT
Start: 2024-10-09

## 2024-10-09 NOTE — TELEPHONE ENCOUNTER
Rx sent to pharmacy as previously filled and verified by Verbal Order Read Back with provider.    NV 01/02/2025

## 2024-10-21 ENCOUNTER — HOSPITAL ENCOUNTER (OUTPATIENT)
Facility: HOSPITAL | Age: 74
Discharge: HOME | End: 2024-10-24
Payer: MEDICARE

## 2024-10-21 ENCOUNTER — TRANSCRIBE ORDERS (OUTPATIENT)
Facility: HOSPITAL | Age: 74
End: 2024-10-21

## 2024-10-21 VITALS — HEIGHT: 65 IN | BODY MASS INDEX: 27.49 KG/M2 | WEIGHT: 165 LBS

## 2024-10-21 DIAGNOSIS — Z12.31 VISIT FOR SCREENING MAMMOGRAM: Primary | ICD-10-CM

## 2024-10-21 DIAGNOSIS — Z12.31 VISIT FOR SCREENING MAMMOGRAM: ICD-10-CM

## 2024-10-21 PROCEDURE — 77067 SCR MAMMO BI INCL CAD: CPT

## 2025-01-02 ENCOUNTER — OFFICE VISIT (OUTPATIENT)
Age: 75
End: 2025-01-02
Payer: MEDICARE

## 2025-01-02 VITALS
SYSTOLIC BLOOD PRESSURE: 167 MMHG | OXYGEN SATURATION: 97 % | TEMPERATURE: 97.8 F | WEIGHT: 160.2 LBS | DIASTOLIC BLOOD PRESSURE: 113 MMHG | BODY MASS INDEX: 26.69 KG/M2 | RESPIRATION RATE: 16 BRPM | HEIGHT: 65 IN | HEART RATE: 87 BPM

## 2025-01-02 DIAGNOSIS — Z79.899 OTHER LONG TERM (CURRENT) DRUG THERAPY: ICD-10-CM

## 2025-01-02 DIAGNOSIS — R79.89 ELEVATED PARATHYROID HORMONE: ICD-10-CM

## 2025-01-02 DIAGNOSIS — E11.9 TYPE 2 DIABETES MELLITUS WITHOUT COMPLICATION, WITHOUT LONG-TERM CURRENT USE OF INSULIN (HCC): ICD-10-CM

## 2025-01-02 DIAGNOSIS — I10 ESSENTIAL HYPERTENSION: Primary | ICD-10-CM

## 2025-01-02 DIAGNOSIS — I10 ESSENTIAL HYPERTENSION: ICD-10-CM

## 2025-01-02 DIAGNOSIS — E83.52 SERUM CALCIUM ELEVATED: ICD-10-CM

## 2025-01-02 PROCEDURE — 99214 OFFICE O/P EST MOD 30 MIN: CPT | Performed by: INTERNAL MEDICINE

## 2025-01-02 RX ORDER — AMLODIPINE BESYLATE 5 MG/1
5 TABLET ORAL DAILY
Qty: 30 TABLET | Refills: 1 | Status: SHIPPED | OUTPATIENT
Start: 2025-01-02

## 2025-01-02 ASSESSMENT — PATIENT HEALTH QUESTIONNAIRE - PHQ9
2. FEELING DOWN, DEPRESSED OR HOPELESS: NOT AT ALL
SUM OF ALL RESPONSES TO PHQ QUESTIONS 1-9: 0
SUM OF ALL RESPONSES TO PHQ QUESTIONS 1-9: 0
1. LITTLE INTEREST OR PLEASURE IN DOING THINGS: NOT AT ALL
SUM OF ALL RESPONSES TO PHQ QUESTIONS 1-9: 0
SUM OF ALL RESPONSES TO PHQ QUESTIONS 1-9: 0
SUM OF ALL RESPONSES TO PHQ9 QUESTIONS 1 & 2: 0

## 2025-01-02 NOTE — PROGRESS NOTES
Do Mari is a 74 y.o. female Established patient who presents today for evaluation of the following -           Assessment & Plan      1. Hypertension.  -taking olmesartan 40 mg daily. Hctz stopped in 9/2024 due to elevated calcium and pth.  Need repeat levels at this time. If persistently elevated, will need endocrine consultation.   -bp not controlled with olmesartan 40mg daily alone.  Added amlodipine 5mg daily. Return in 1 month for bp reevaluation.     2. diabetes mellitus type   -diet controlled.   -need labs today    3. Hypercalcemia  -with elevated PTH.  -possible from longterm use of hctz.  Medication was stopped 3 months ago and need reevaluation at this time.       Orders Placed This Encounter    CBC with Auto Differential     Standing Status:   Future     Number of Occurrences:   1     Standing Expiration Date:   1/2/2026    Comprehensive Metabolic Panel     Standing Status:   Future     Number of Occurrences:   1     Standing Expiration Date:   1/2/2026    Hemoglobin A1C     Standing Status:   Future     Number of Occurrences:   1     Standing Expiration Date:   1/2/2026    Lipid Panel     Standing Status:   Future     Number of Occurrences:   1     Standing Expiration Date:   1/2/2026     Order Specific Question:   Is Patient Fasting?/# of Hours     Answer:   8     Order Specific Question:   Has the patient fasted?     Answer:   Yes    Albumin/Creatinine Ratio, Urine     Standing Status:   Future     Number of Occurrences:   1     Standing Expiration Date:   1/2/2026    TSH     Standing Status:   Future     Number of Occurrences:   1     Standing Expiration Date:   1/2/2026    PTH, Intact     Standing Status:   Future     Number of Occurrences:   1     Standing Expiration Date:   1/2/2026    amLODIPine (NORVASC) 5 MG tablet     Sig: Take 1 tablet by mouth daily     Dispense:  30 tablet     Refill:  1      Follow-up  The patient will follow up on 02/18/2025.                      History of

## 2025-01-03 LAB
ALBUMIN SERPL-MCNC: 3.7 G/DL (ref 3.5–5)
ALBUMIN/GLOB SERPL: 1 (ref 1.1–2.2)
ALP SERPL-CCNC: 153 U/L (ref 45–117)
ALT SERPL-CCNC: 31 U/L (ref 12–78)
ANION GAP SERPL CALC-SCNC: 5 MMOL/L (ref 2–12)
AST SERPL-CCNC: 18 U/L (ref 15–37)
BASOPHILS # BLD: 0 K/UL (ref 0–0.1)
BASOPHILS NFR BLD: 0 % (ref 0–1)
BILIRUB SERPL-MCNC: 1.6 MG/DL (ref 0.2–1)
BUN SERPL-MCNC: 11 MG/DL (ref 6–20)
BUN/CREAT SERPL: 14 (ref 12–20)
CALCIUM SERPL-MCNC: 11 MG/DL (ref 8.5–10.1)
CALCIUM SERPL-MCNC: 11.1 MG/DL (ref 8.5–10.1)
CHLORIDE SERPL-SCNC: 111 MMOL/L (ref 97–108)
CHOLEST SERPL-MCNC: 132 MG/DL
CO2 SERPL-SCNC: 25 MMOL/L (ref 21–32)
CREAT SERPL-MCNC: 0.78 MG/DL (ref 0.55–1.02)
CREAT UR-MCNC: 51.6 MG/DL
DIFFERENTIAL METHOD BLD: ABNORMAL
EOSINOPHIL # BLD: 0.1 K/UL (ref 0–0.4)
EOSINOPHIL NFR BLD: 2 % (ref 0–7)
ERYTHROCYTE [DISTWIDTH] IN BLOOD BY AUTOMATED COUNT: 15.6 % (ref 11.5–14.5)
EST. AVERAGE GLUCOSE BLD GHB EST-MCNC: 140 MG/DL
GLOBULIN SER CALC-MCNC: 3.8 G/DL (ref 2–4)
GLUCOSE SERPL-MCNC: 111 MG/DL (ref 65–100)
HBA1C MFR BLD: 6.5 % (ref 4–5.6)
HCT VFR BLD AUTO: 42.6 % (ref 35–47)
HDLC SERPL-MCNC: 52 MG/DL
HDLC SERPL: 2.5 (ref 0–5)
HGB BLD-MCNC: 14.2 G/DL (ref 11.5–16)
IMM GRANULOCYTES # BLD AUTO: 0 K/UL (ref 0–0.04)
IMM GRANULOCYTES NFR BLD AUTO: 0 % (ref 0–0.5)
LDLC SERPL CALC-MCNC: 63.4 MG/DL (ref 0–100)
LYMPHOCYTES # BLD: 2.8 K/UL (ref 0.8–3.5)
LYMPHOCYTES NFR BLD: 39 % (ref 12–49)
MCH RBC QN AUTO: 28.3 PG (ref 26–34)
MCHC RBC AUTO-ENTMCNC: 33.3 G/DL (ref 30–36.5)
MCV RBC AUTO: 84.9 FL (ref 80–99)
MICROALBUMIN UR-MCNC: 10.9 MG/DL
MICROALBUMIN/CREAT UR-RTO: 211 MG/G (ref 0–30)
MONOCYTES # BLD: 0.4 K/UL (ref 0–1)
MONOCYTES NFR BLD: 6 % (ref 5–13)
NEUTS SEG # BLD: 3.8 K/UL (ref 1.8–8)
NEUTS SEG NFR BLD: 53 % (ref 32–75)
NRBC # BLD: 0 K/UL (ref 0–0.01)
NRBC BLD-RTO: 0 PER 100 WBC
PLATELET # BLD AUTO: 196 K/UL (ref 150–400)
PMV BLD AUTO: 12 FL (ref 8.9–12.9)
POTASSIUM SERPL-SCNC: 3.7 MMOL/L (ref 3.5–5.1)
PROT SERPL-MCNC: 7.5 G/DL (ref 6.4–8.2)
PTH-INTACT SERPL-MCNC: 100 PG/ML (ref 18.4–88)
RBC # BLD AUTO: 5.02 M/UL (ref 3.8–5.2)
SODIUM SERPL-SCNC: 141 MMOL/L (ref 136–145)
TRIGL SERPL-MCNC: 83 MG/DL
TSH SERPL DL<=0.05 MIU/L-ACNC: 0.66 UIU/ML (ref 0.36–3.74)
VLDLC SERPL CALC-MCNC: 16.6 MG/DL
WBC # BLD AUTO: 7.2 K/UL (ref 3.6–11)

## 2025-01-06 ENCOUNTER — TELEPHONE (OUTPATIENT)
Age: 75
End: 2025-01-06

## 2025-01-06 DIAGNOSIS — E21.3 HYPERPARATHYROIDISM (HCC): Primary | ICD-10-CM

## 2025-01-06 RX ORDER — OLMESARTAN MEDOXOMIL 40 MG/1
40 TABLET ORAL DAILY
Qty: 90 TABLET | Refills: 0 | Status: SHIPPED | OUTPATIENT
Start: 2025-01-06

## 2025-01-06 NOTE — TELEPHONE ENCOUNTER
Last office visit 1/02/25    NEXT APPT  With Internal Medicine (Ghislaine Richardson MD)  02/18/2025 at 8:00 AM    Last fill on olmesartan 10/09/24 #90 with no additional refill

## 2025-01-06 NOTE — TELEPHONE ENCOUNTER
1/6/2025 spoke with patient.     -notified that despite stopping use of her thiazide diuretic, her calcium and PTH are still elevated.  I recommended she consult with Dr. NIYAH Borrero, endocrinology.     Patient stated understanding of the instructions and plan for medical care as outlined.

## 2025-01-24 NOTE — TELEPHONE ENCOUNTER
Last office visit 1/2/25  NEXT APPT  With Internal Medicine (Ghislaine Richardson MD)  02/18/2025 at 8:00 AM    Last fill on amlodipine 1/2/25 #30 with 1 additional refill

## 2025-01-26 RX ORDER — AMLODIPINE BESYLATE 5 MG/1
5 TABLET ORAL DAILY
Qty: 90 TABLET | Refills: 1 | OUTPATIENT
Start: 2025-01-26

## 2025-02-16 SDOH — ECONOMIC STABILITY: TRANSPORTATION INSECURITY
IN THE PAST 12 MONTHS, HAS THE LACK OF TRANSPORTATION KEPT YOU FROM MEDICAL APPOINTMENTS OR FROM GETTING MEDICATIONS?: NO

## 2025-02-16 SDOH — ECONOMIC STABILITY: FOOD INSECURITY: WITHIN THE PAST 12 MONTHS, THE FOOD YOU BOUGHT JUST DIDN'T LAST AND YOU DIDN'T HAVE MONEY TO GET MORE.: NEVER TRUE

## 2025-02-16 SDOH — HEALTH STABILITY: PHYSICAL HEALTH: ON AVERAGE, HOW MANY MINUTES DO YOU ENGAGE IN EXERCISE AT THIS LEVEL?: 20 MIN

## 2025-02-16 SDOH — ECONOMIC STABILITY: FOOD INSECURITY: WITHIN THE PAST 12 MONTHS, YOU WORRIED THAT YOUR FOOD WOULD RUN OUT BEFORE YOU GOT MONEY TO BUY MORE.: NEVER TRUE

## 2025-02-16 SDOH — ECONOMIC STABILITY: INCOME INSECURITY: IN THE LAST 12 MONTHS, WAS THERE A TIME WHEN YOU WERE NOT ABLE TO PAY THE MORTGAGE OR RENT ON TIME?: NO

## 2025-02-16 SDOH — HEALTH STABILITY: PHYSICAL HEALTH: ON AVERAGE, HOW MANY DAYS PER WEEK DO YOU ENGAGE IN MODERATE TO STRENUOUS EXERCISE (LIKE A BRISK WALK)?: 7 DAYS

## 2025-02-16 ASSESSMENT — PATIENT HEALTH QUESTIONNAIRE - PHQ9
SUM OF ALL RESPONSES TO PHQ QUESTIONS 1-9: 0
SUM OF ALL RESPONSES TO PHQ9 QUESTIONS 1 & 2: 0
2. FEELING DOWN, DEPRESSED OR HOPELESS: NOT AT ALL
1. LITTLE INTEREST OR PLEASURE IN DOING THINGS: NOT AT ALL
SUM OF ALL RESPONSES TO PHQ QUESTIONS 1-9: 0

## 2025-02-16 ASSESSMENT — LIFESTYLE VARIABLES
HOW OFTEN DO YOU HAVE A DRINK CONTAINING ALCOHOL: 2
HOW MANY STANDARD DRINKS CONTAINING ALCOHOL DO YOU HAVE ON A TYPICAL DAY: 0
HOW OFTEN DO YOU HAVE SIX OR MORE DRINKS ON ONE OCCASION: 1
HOW OFTEN DO YOU HAVE A DRINK CONTAINING ALCOHOL: MONTHLY OR LESS
HOW MANY STANDARD DRINKS CONTAINING ALCOHOL DO YOU HAVE ON A TYPICAL DAY: PATIENT DOES NOT DRINK

## 2025-02-18 ENCOUNTER — OFFICE VISIT (OUTPATIENT)
Age: 75
End: 2025-02-18
Payer: MEDICARE

## 2025-02-18 VITALS
WEIGHT: 159.4 LBS | DIASTOLIC BLOOD PRESSURE: 80 MMHG | RESPIRATION RATE: 16 BRPM | OXYGEN SATURATION: 97 % | HEIGHT: 65 IN | SYSTOLIC BLOOD PRESSURE: 130 MMHG | BODY MASS INDEX: 26.56 KG/M2 | TEMPERATURE: 97.5 F | HEART RATE: 83 BPM

## 2025-02-18 DIAGNOSIS — E11.9 TYPE 2 DIABETES MELLITUS WITHOUT COMPLICATION, WITHOUT LONG-TERM CURRENT USE OF INSULIN (HCC): ICD-10-CM

## 2025-02-18 DIAGNOSIS — Z78.0 MENOPAUSE: ICD-10-CM

## 2025-02-18 DIAGNOSIS — E21.3 HYPERPARATHYROIDISM: ICD-10-CM

## 2025-02-18 DIAGNOSIS — Z00.00 MEDICARE ANNUAL WELLNESS VISIT, SUBSEQUENT: Primary | ICD-10-CM

## 2025-02-18 DIAGNOSIS — I10 ESSENTIAL HYPERTENSION: ICD-10-CM

## 2025-02-18 DIAGNOSIS — Z79.899 ENCOUNTER FOR LONG-TERM (CURRENT) USE OF MEDICATIONS: ICD-10-CM

## 2025-02-18 DIAGNOSIS — M85.80 OSTEOPENIA, UNSPECIFIED LOCATION: ICD-10-CM

## 2025-02-18 PROCEDURE — 99214 OFFICE O/P EST MOD 30 MIN: CPT | Performed by: INTERNAL MEDICINE

## 2025-02-18 RX ORDER — AMLODIPINE BESYLATE 5 MG/1
5 TABLET ORAL DAILY
Qty: 90 TABLET | Refills: 1 | Status: SHIPPED | OUTPATIENT
Start: 2025-02-18

## 2025-02-18 NOTE — PROGRESS NOTES
Chief Complaint   Patient presents with    Medicare AWV     eviewed record in preparation for visit and have obtained necessary documentation.    Identified pt with two pt identifiers(name and ).      Health Maintenance Due   Topic    Shingles vaccine (1 of 2)    Diabetic retinal exam     Diabetic foot exam     Annual Wellness Visit (Medicare)     Respiratory Syncytial Virus (RSV) Pregnant or age 60 yrs+ (1 - 1-dose 75+ series)    COVID-19 Vaccine ( season)         Chief Complaint   Patient presents with    Medicare AWV        Wt Readings from Last 3 Encounters:   25 72.3 kg (159 lb 6.4 oz)   25 72.7 kg (160 lb 3.2 oz)   10/21/24 74.8 kg (165 lb)     Temp Readings from Last 3 Encounters:   25 97.5 °F (36.4 °C) (Temporal)   25 97.8 °F (36.6 °C) (Temporal)   10/01/24 97.5 °F (36.4 °C) (Temporal)     BP Readings from Last 3 Encounters:   25 (!) 150/90   25 (!) 167/113   10/01/24 132/84     Pulse Readings from Last 3 Encounters:   25 83   25 87   10/01/24 63           Learning Assessment:  :    Failed to redirect to the Timeline version of the RE[employer]FS SmartLink.    Depression Screening:  :         No data to display                Fall Risk Assessment:  :    Failed to redirect to the Timeline version of the RE[employer]FS SmartLink.    Abuse Screening:  :         No data to display

## 2025-02-18 NOTE — PATIENT INSTRUCTIONS
A Healthy Heart: Care Instructions  Overview     Coronary artery disease, also called heart disease, occurs when a substance called plaque builds up in the vessels that supply oxygen-rich blood to your heart muscle. This can narrow the blood vessels and reduce blood flow. A heart attack happens when blood flow is completely blocked. A high-fat diet, smoking, and other factors increase the risk of heart disease.  Your doctor has found that you have a chance of having heart disease. A heart-healthy lifestyle can help keep your heart healthy and prevent heart disease. This lifestyle includes eating healthy, being active, staying at a weight that's healthy for you, and not smoking or using tobacco. It also includes taking medicines as directed, managing other health conditions, and trying to get a healthy amount of sleep.  Follow-up care is a key part of your treatment and safety. Be sure to make and go to all appointments, and call your doctor if you are having problems. It's also a good idea to know your test results and keep a list of the medicines you take.  How can you care for yourself at home?  Diet    Use less salt when you cook and eat. This helps lower your blood pressure. Taste food before salting. Add only a little salt when you think you need it. With time, your taste buds will adjust to less salt.     Eat fewer snack items, fast foods, canned soups, and other high-salt, high-fat, processed foods.     Read food labels and try to avoid saturated and trans fats. They increase your risk of heart disease by raising cholesterol levels.     Limit the amount of solid fat--butter, margarine, and shortening--you eat. Use olive, peanut, or canola oil when you cook. Bake, broil, and steam foods instead of frying them.     Eat a variety of fruit and vegetables every day. Dark green, deep orange, red, or yellow fruits and vegetables are especially good for you. Examples include spinach, carrots, peaches, and  PCP: YASMEEN Eugene NP    Last appt: 5/17/2023   Future Appointments   Date Time Provider [address]   5/30/2024  9:30 AM YASMEEN Chao NP [facility]       Requested Prescriptions     Pending Prescriptions Disp Refills    hydrOXYzine HCl (ATARAX) 50 MG tablet [Pharmacy Med Name: HYDROXYZINE HCL 50MG TABS (WHITE)] 90 tablet 0     Sig: TAKE 1 TABLET BY MOUTH DAILY AS NEEDED FOR ANXIETY         Prior labs and Blood pressures:  BP Readings from Last 3 Encounters:   09/14/23 116/70   08/31/23 (!) 143/76   08/24/23 112/76     Lab Results   Component Value Date/Time     07/10/2023 10:33 AM    K 3.9 07/10/2023 10:33 AM     07/10/2023 10:33 AM    CO2 29 07/10/2023 10:33 AM    BUN 9 07/10/2023 10:33 AM    GFRAA 133 09/11/2020 12:00 AM     No results found for: \"HBA1C\", \"[id number]\"  Lab Results   Component Value Date/Time    CHOL 157 05/17/2023 12:00 AM    CHOL 167 12/28/2022 12:00 AM    HDL 44 05/17/2023 12:00 AM    VLDL 17 12/28/2022 12:00 AM     No results found for: \"VITD3\", \"VD3RIA\"    No results found for: \"TSH\", \"TSH2\", \"TSH3\"

## 2025-02-18 NOTE — PROGRESS NOTES
Medicare Annual Wellness Visit    Do Mari is here for Medicare AWV and follow up -     Assessment & Plan  1. Medicare wellness exam  - Up-to-date Advance Care Plan (ACP) on file  - Healthcare decision makers reviewed and updated  - Laboratory tests completed in January 2024  - she had follow-up screening colonoscopy with Dr. Herzog around 2023  - Up to date with mammogram  - Due for tetanus booster in 2025, prescription provided  - RSV vaccine recommended, prescription given  - Declined COVID-19 and shingles vaccines, current with influenza and pneumonia vaccines      2. Hypertension  - Blood pressure significantly improved  - On olmesartan 40 mg daily  - Amlodipine 5 mg added on 01/02/2025 due to poorly controlled blood pressure  - Continue current doses of olmesartan and amlodipine    3. Type 2 diabetes, diet controlled  - Hemoglobin A1c on 01/02/2025 was 6.5%    4. Hypercalcemia with elevated parathyroid hormone (PTH)  - Appointment with endocrinologist Dr. Tracy Price on 04/24/2025  - Letter sent to Dr. Price for consultation        Orders Placed This Encounter    DEXA BONE DENSITY AXIAL SKELETON     Standing Status:   Future     Standing Expiration Date:   2/18/2026    amLODIPine (NORVASC) 5 MG tablet     Sig: Take 1 tablet by mouth daily     Dispense:  90 tablet     Refill:  1    Tdap (ADACEL) 5-2-15.5 LF-MCG/0.5 injection     Sig: Inject 0.5 mLs into the muscle once for 1 dose     Dispense:  0.5 mL     Refill:  0    respiratory syncytial vaccine, adjuvanted (AREXVY) 120 MCG/0.5ML injection     Sig: Inject 0.5 mLs into the muscle once for 1 dose     Dispense:  1 each     Refill:  0         Return in about 6 months (around 8/18/2025) for follow up.     Subjective     History of Present Illness  75-year-old female presents for Medicare wellness exam and follow up -     Adhering to blood pressure medication regimen, systolic readings around 130 at home. Engages in regular physical

## 2025-04-14 RX ORDER — ATORVASTATIN CALCIUM 10 MG/1
10 TABLET, FILM COATED ORAL DAILY
Qty: 90 TABLET | Refills: 1 | Status: SHIPPED | OUTPATIENT
Start: 2025-04-14

## 2025-04-14 NOTE — TELEPHONE ENCOUNTER
Rx sent to pharmacy as previously filled and verified by Verbal Order Read Back with provider.    NV 08/18/2025

## 2025-04-28 RX ORDER — OLMESARTAN MEDOXOMIL 40 MG/1
40 TABLET ORAL DAILY
Qty: 90 TABLET | Refills: 0 | Status: SHIPPED | OUTPATIENT
Start: 2025-04-28 | End: 2025-04-28 | Stop reason: SDUPTHER

## 2025-04-28 RX ORDER — OLMESARTAN MEDOXOMIL 40 MG/1
40 TABLET ORAL DAILY
Qty: 90 TABLET | Refills: 1 | Status: SHIPPED | OUTPATIENT
Start: 2025-04-28

## 2025-05-09 ENCOUNTER — HOSPITAL ENCOUNTER (OUTPATIENT)
Facility: HOSPITAL | Age: 75
Discharge: HOME | End: 2025-05-12
Attending: INTERNAL MEDICINE
Payer: MEDICARE

## 2025-05-09 VITALS — WEIGHT: 161 LBS | BODY MASS INDEX: 26.82 KG/M2 | HEIGHT: 65 IN

## 2025-05-09 DIAGNOSIS — Z78.0 MENOPAUSE: ICD-10-CM

## 2025-05-09 PROCEDURE — 77080 DXA BONE DENSITY AXIAL: CPT

## 2025-05-14 ENCOUNTER — HOSPITAL ENCOUNTER (OUTPATIENT)
Facility: HOSPITAL | Age: 75
Discharge: HOME | End: 2025-05-17
Payer: MEDICARE

## 2025-05-14 DIAGNOSIS — E21.3 HYPERPARATHYROIDISM, UNSPECIFIED: ICD-10-CM

## 2025-05-14 PROCEDURE — 76536 US EXAM OF HEAD AND NECK: CPT

## 2025-05-15 ENCOUNTER — RESULTS FOLLOW-UP (OUTPATIENT)
Age: 75
End: 2025-05-15

## 2025-05-15 DIAGNOSIS — Z92.89 HISTORY OF BONE DENSITY STUDY: Primary | Chronic | ICD-10-CM

## 2025-06-23 ENCOUNTER — OFFICE VISIT (OUTPATIENT)
Age: 75
End: 2025-06-23
Payer: MEDICARE

## 2025-06-23 VITALS
DIASTOLIC BLOOD PRESSURE: 80 MMHG | BODY MASS INDEX: 27.06 KG/M2 | HEART RATE: 89 BPM | SYSTOLIC BLOOD PRESSURE: 140 MMHG | WEIGHT: 162.4 LBS | HEIGHT: 65 IN | OXYGEN SATURATION: 96 %

## 2025-06-23 DIAGNOSIS — E83.52 HYPERCALCEMIA: ICD-10-CM

## 2025-06-23 DIAGNOSIS — Z76.89 ESTABLISHING CARE WITH NEW DOCTOR, ENCOUNTER FOR: ICD-10-CM

## 2025-06-23 DIAGNOSIS — E11.9 TYPE 2 DIABETES MELLITUS WITHOUT COMPLICATION, WITHOUT LONG-TERM CURRENT USE OF INSULIN (HCC): ICD-10-CM

## 2025-06-23 DIAGNOSIS — E78.2 MIXED HYPERLIPIDEMIA: ICD-10-CM

## 2025-06-23 DIAGNOSIS — R01.1 UNDIAGNOSED CARDIAC MURMURS: Primary | ICD-10-CM

## 2025-06-23 DIAGNOSIS — R94.31 ABNORMAL EKG: ICD-10-CM

## 2025-06-23 DIAGNOSIS — I10 ESSENTIAL HYPERTENSION: ICD-10-CM

## 2025-06-23 PROCEDURE — G2211 COMPLEX E/M VISIT ADD ON: HCPCS | Performed by: INTERNAL MEDICINE

## 2025-06-23 PROCEDURE — 99204 OFFICE O/P NEW MOD 45 MIN: CPT | Performed by: INTERNAL MEDICINE

## 2025-06-23 PROCEDURE — 1090F PRES/ABSN URINE INCON ASSESS: CPT | Performed by: INTERNAL MEDICINE

## 2025-06-23 PROCEDURE — 1126F AMNT PAIN NOTED NONE PRSNT: CPT | Performed by: INTERNAL MEDICINE

## 2025-06-23 PROCEDURE — 93005 ELECTROCARDIOGRAM TRACING: CPT | Performed by: INTERNAL MEDICINE

## 2025-06-23 PROCEDURE — 93010 ELECTROCARDIOGRAM REPORT: CPT | Performed by: INTERNAL MEDICINE

## 2025-06-23 PROCEDURE — 3044F HG A1C LEVEL LT 7.0%: CPT | Performed by: INTERNAL MEDICINE

## 2025-06-23 PROCEDURE — 1123F ACP DISCUSS/DSCN MKR DOCD: CPT | Performed by: INTERNAL MEDICINE

## 2025-06-23 PROCEDURE — 3079F DIAST BP 80-89 MM HG: CPT | Performed by: INTERNAL MEDICINE

## 2025-06-23 PROCEDURE — 1159F MED LIST DOCD IN RCRD: CPT | Performed by: INTERNAL MEDICINE

## 2025-06-23 PROCEDURE — 3077F SYST BP >= 140 MM HG: CPT | Performed by: INTERNAL MEDICINE

## 2025-06-23 PROCEDURE — G8427 DOCREV CUR MEDS BY ELIG CLIN: HCPCS | Performed by: INTERNAL MEDICINE

## 2025-06-23 PROCEDURE — G8419 CALC BMI OUT NRM PARAM NOF/U: HCPCS | Performed by: INTERNAL MEDICINE

## 2025-06-23 PROCEDURE — G8399 PT W/DXA RESULTS DOCUMENT: HCPCS | Performed by: INTERNAL MEDICINE

## 2025-06-23 PROCEDURE — 1036F TOBACCO NON-USER: CPT | Performed by: INTERNAL MEDICINE

## 2025-06-23 ASSESSMENT — PATIENT HEALTH QUESTIONNAIRE - PHQ9
SUM OF ALL RESPONSES TO PHQ QUESTIONS 1-9: 0
1. LITTLE INTEREST OR PLEASURE IN DOING THINGS: NOT AT ALL
SUM OF ALL RESPONSES TO PHQ QUESTIONS 1-9: 0
2. FEELING DOWN, DEPRESSED OR HOPELESS: NOT AT ALL

## 2025-06-23 NOTE — PROGRESS NOTES
1. Have you been to the ER, urgent care clinic since your last visit?  Hospitalized since your last visit?  No    2. Have you seen or consulted any other health care providers outside of the [facility] since your last visit?  Include any pap smears or colon screening.   No

## 2025-06-23 NOTE — PROGRESS NOTES
[address] [place] [address] [phone]    [address] [place] [address]     Subjective:        Do Mari is a 75 y.o. female is here for a new patient visit for a few different things.  Dr. Richardson heard a slight heart murmur, also the patient was found to have an elevated calcium level for which she was referred to an endocrinologist.  She also has risk factors for coronary artery disease including hypertension, hyperlipidemia, and mild type 2 diabetes with a hemoglobin A1c of 6.5 at last check.  The patient denies chest pain/ shortness of breath, orthopnea, PND, LE edema, palpitations, syncope, presyncope or fatigue.  The patient likes to exercise regularly, with 15 minutes of stretches and 30 minutes of weight training with a YouTube video every other day.    Patient Active Problem List    Diagnosis Date Noted    Advance directive in chart 02/20/2017    Routine eye exam 01/12/2015    Hyperlipidemia 01/09/2014    History of bone density study 11/27/2013    Diabetes mellitus, type 2 (HCC) 07/17/2013    Screen for colon cancer 03/22/2012    History of screening mammography 03/22/2012    Pap smear for cervical cancer screening 03/22/2012    Essential hypertension 03/01/2012      Ghislaine Richardson MD  Past Medical History:   Diagnosis Date    Pap smear for cervical cancer screening 3/22/2012    Post-menopause       Past Surgical History:   Procedure Laterality Date    CATARACT REMOVAL Left 12/01/2021    COLONOSCOPY  02/02/2018    nml per pt    CORNEAL TRANSPLANT Left 12/01/2021    WISDOM TOOTH EXTRACTION Right 03/12/2018    x1 upper     Allergies   Allergen Reactions    Avocado Swelling     Tongue swelling    Peanut-Containing Drug Products Anaphylaxis    Cocoa      Other reaction(s): Unknown (comments)  Pt states she is not allergic to Cocoa; just Coconut.     Coconut (Cocos Nucifera) Swelling     coconut extract    Coconut Fatty Acid Swelling     Swelling of the tongue

## 2025-07-01 ENCOUNTER — ANCILLARY PROCEDURE (OUTPATIENT)
Age: 75
End: 2025-07-01
Payer: MEDICARE

## 2025-07-01 VITALS
WEIGHT: 162 LBS | BODY MASS INDEX: 26.99 KG/M2 | HEIGHT: 65 IN | HEART RATE: 89 BPM | DIASTOLIC BLOOD PRESSURE: 100 MMHG | SYSTOLIC BLOOD PRESSURE: 165 MMHG

## 2025-07-01 DIAGNOSIS — E83.52 HYPERCALCEMIA: ICD-10-CM

## 2025-07-01 DIAGNOSIS — E11.9 TYPE 2 DIABETES MELLITUS WITHOUT COMPLICATION, WITHOUT LONG-TERM CURRENT USE OF INSULIN (HCC): ICD-10-CM

## 2025-07-01 DIAGNOSIS — I10 ESSENTIAL HYPERTENSION: ICD-10-CM

## 2025-07-01 DIAGNOSIS — R01.1 UNDIAGNOSED CARDIAC MURMURS: ICD-10-CM

## 2025-07-01 DIAGNOSIS — Z76.89 ESTABLISHING CARE WITH NEW DOCTOR, ENCOUNTER FOR: ICD-10-CM

## 2025-07-01 DIAGNOSIS — E78.2 MIXED HYPERLIPIDEMIA: ICD-10-CM

## 2025-07-01 PROCEDURE — 93306 TTE W/DOPPLER COMPLETE: CPT

## 2025-07-02 LAB
ECHO AO ASC DIAM: 3.2 CM
ECHO AO ASCENDING AORTA INDEX: 1.77 CM/M2
ECHO AO ROOT DIAM: 2.9 CM
ECHO AO ROOT INDEX: 1.6 CM/M2
ECHO AV AREA PEAK VELOCITY: 1.6 CM2
ECHO AV AREA VTI: 1.8 CM2
ECHO AV AREA/BSA PEAK VELOCITY: 0.9 CM2/M2
ECHO AV AREA/BSA VTI: 1 CM2/M2
ECHO AV MEAN GRADIENT: 5 MMHG
ECHO AV MEAN VELOCITY: 1 M/S
ECHO AV PEAK GRADIENT: 9 MMHG
ECHO AV PEAK VELOCITY: 1.5 M/S
ECHO AV VELOCITY RATIO: 0.53
ECHO AV VTI: 35 CM
ECHO BSA: 1.84 M2
ECHO LA DIAMETER INDEX: 2.76 CM/M2
ECHO LA DIAMETER: 5 CM
ECHO LA TO AORTIC ROOT RATIO: 1.72
ECHO LA VOL A-L A2C: 70 ML (ref 22–52)
ECHO LA VOL A-L A4C: 81 ML (ref 22–52)
ECHO LA VOL BP: 72 ML (ref 22–52)
ECHO LA VOL MOD A2C: 67 ML (ref 22–52)
ECHO LA VOL MOD A4C: 74 ML (ref 22–52)
ECHO LA VOL/BSA BIPLANE: 40 ML/M2 (ref 16–34)
ECHO LA VOLUME AREA LENGTH: 78 ML
ECHO LA VOLUME INDEX A-L A2C: 39 ML/M2 (ref 16–34)
ECHO LA VOLUME INDEX A-L A4C: 45 ML/M2 (ref 16–34)
ECHO LA VOLUME INDEX AREA LENGTH: 43 ML/M2 (ref 16–34)
ECHO LA VOLUME INDEX MOD A2C: 37 ML/M2 (ref 16–34)
ECHO LA VOLUME INDEX MOD A4C: 41 ML/M2 (ref 16–34)
ECHO LV E' LATERAL VELOCITY: 8.51 CM/S
ECHO LV E' SEPTAL VELOCITY: 5.03 CM/S
ECHO LV EDV A2C: 106 ML
ECHO LV EDV A4C: 105 ML
ECHO LV EDV BP: 112 ML (ref 56–104)
ECHO LV EDV INDEX A4C: 58 ML/M2
ECHO LV EDV INDEX BP: 62 ML/M2
ECHO LV EDV NDEX A2C: 59 ML/M2
ECHO LV EF PHYSICIAN: 45 %
ECHO LV EJECTION FRACTION A2C: 45 %
ECHO LV EJECTION FRACTION A4C: 32 %
ECHO LV EJECTION FRACTION BIPLANE: 42 % (ref 55–100)
ECHO LV ESV A2C: 58 ML
ECHO LV ESV A4C: 71 ML
ECHO LV ESV BP: 65 ML (ref 19–49)
ECHO LV ESV INDEX A2C: 32 ML/M2
ECHO LV ESV INDEX A4C: 39 ML/M2
ECHO LV ESV INDEX BP: 36 ML/M2
ECHO LV FRACTIONAL SHORTENING: 19 % (ref 28–44)
ECHO LV INTERNAL DIMENSION DIASTOLE INDEX: 3.15 CM/M2
ECHO LV INTERNAL DIMENSION DIASTOLIC: 5.7 CM (ref 3.9–5.3)
ECHO LV INTERNAL DIMENSION SYSTOLIC INDEX: 2.54 CM/M2
ECHO LV INTERNAL DIMENSION SYSTOLIC: 4.6 CM
ECHO LV IVSD: 1.1 CM (ref 0.6–0.9)
ECHO LV MASS 2D: 256.7 G (ref 67–162)
ECHO LV MASS INDEX 2D: 141.8 G/M2 (ref 43–95)
ECHO LV POSTERIOR WALL DIASTOLIC: 1.1 CM (ref 0.6–0.9)
ECHO LV RELATIVE WALL THICKNESS RATIO: 0.39
ECHO LVOT AREA: 3.1 CM2
ECHO LVOT AV VTI INDEX: 0.58
ECHO LVOT DIAM: 2 CM
ECHO LVOT MEAN GRADIENT: 2 MMHG
ECHO LVOT PEAK GRADIENT: 3 MMHG
ECHO LVOT PEAK VELOCITY: 0.8 M/S
ECHO LVOT STROKE VOLUME INDEX: 35.2 ML/M2
ECHO LVOT SV: 63.7 ML
ECHO LVOT VTI: 20.3 CM
ECHO MV A VELOCITY: 0.9 M/S
ECHO MV E DECELERATION TIME (DT): 216.2 MS
ECHO MV E VELOCITY: 0.81 M/S
ECHO MV E/A RATIO: 0.9
ECHO MV E/E' LATERAL: 9.52
ECHO MV E/E' RATIO (AVERAGED): 12.81
ECHO MV E/E' SEPTAL: 16.1
ECHO PULMONARY ARTERY END DIASTOLIC PRESSURE: 9 MMHG
ECHO PV MAX VELOCITY: 0.9 M/S
ECHO PV PEAK GRADIENT: 3 MMHG
ECHO PV REGURGITANT MAX VELOCITY: 1.5 M/S
ECHO RV BASAL DIMENSION: 3.9 CM
ECHO RV TAPSE: 1.7 CM (ref 1.7–?)
ECHO RVOT PEAK GRADIENT: 1 MMHG
ECHO RVOT PEAK VELOCITY: 0.6 M/S
ECHO TV REGURGITANT MAX VELOCITY: 2.9 M/S
ECHO TV REGURGITANT PEAK GRADIENT: 34 MMHG

## 2025-07-02 PROCEDURE — 93306 TTE W/DOPPLER COMPLETE: CPT | Performed by: INTERNAL MEDICINE

## 2025-07-07 ENCOUNTER — RESULTS FOLLOW-UP (OUTPATIENT)
Age: 75
End: 2025-07-07

## 2025-07-08 ENCOUNTER — PATIENT MESSAGE (OUTPATIENT)
Age: 75
End: 2025-07-08

## 2025-07-08 ENCOUNTER — TELEPHONE (OUTPATIENT)
Age: 75
End: 2025-07-08

## 2025-07-08 NOTE — RESULT ENCOUNTER NOTE
Echo shows mildly reduced function of the heart and mild to moderate leakage of 2 valves.  I recommend metoprolol succinate 25 mg daily which can help with a weak heart.  Proceed with stress testing as planned.      Please also schedule sooner appointment with a nurse practitioner in the next couple months to go over test results and consider changing from Benicar to Entresto, possibly decreasing amlodipine.    Future Appointments  8/18/2025  9:40 AM    Ghislaine Richardson MD WEIM                [facility]  10/22/2025 9:30 AM    [address]  6/23/2026  10:00 AM   Mary Melgar, PABLO - NP  [facility]

## 2025-07-09 RX ORDER — METOPROLOL SUCCINATE 25 MG/1
25 TABLET, EXTENDED RELEASE ORAL DAILY
Qty: 90 TABLET | Refills: 1 | Status: SHIPPED | OUTPATIENT
Start: 2025-07-09

## 2025-07-09 NOTE — TELEPHONE ENCOUNTER
----- Message from Dr. Adán Barnhart MD sent at 7/7/2025  8:28 PM EDT -----  Echo shows mildly reduced function of the heart and mild to moderate leakage of 2 valves.  I recommend metoprolol succinate 25 mg daily which can help with a weak heart.  Proceed with stress testing as planned.      Please also schedule sooner appointment with a nurse practitioner in the next couple months to go over test results and consider changing from Benicar to Entresto, possibly decreasing amlodipine.    Attempted to call patient. Message left to call us back.  Metoprolol succinate 25 mg Rx sent to pharmacy on file.  Appointment with SUMA Melgar NP schedule for 10- at 9:40 am.  My Chart message sent to patient as well.

## 2025-08-14 RX ORDER — AMLODIPINE BESYLATE 5 MG/1
5 TABLET ORAL DAILY
Qty: 90 TABLET | Refills: 1 | Status: SHIPPED | OUTPATIENT
Start: 2025-08-14

## 2025-08-18 ENCOUNTER — OFFICE VISIT (OUTPATIENT)
Age: 75
End: 2025-08-18
Payer: MEDICARE

## 2025-08-18 VITALS
OXYGEN SATURATION: 97 % | RESPIRATION RATE: 16 BRPM | HEIGHT: 65 IN | WEIGHT: 166.2 LBS | TEMPERATURE: 97.1 F | BODY MASS INDEX: 27.69 KG/M2 | SYSTOLIC BLOOD PRESSURE: 150 MMHG | DIASTOLIC BLOOD PRESSURE: 80 MMHG | HEART RATE: 64 BPM

## 2025-08-18 DIAGNOSIS — Z79.899 ENCOUNTER FOR LONG-TERM (CURRENT) USE OF MEDICATIONS: ICD-10-CM

## 2025-08-18 DIAGNOSIS — I10 ESSENTIAL HYPERTENSION: Primary | ICD-10-CM

## 2025-08-18 DIAGNOSIS — E11.9 TYPE 2 DIABETES MELLITUS WITHOUT COMPLICATION, WITHOUT LONG-TERM CURRENT USE OF INSULIN (HCC): ICD-10-CM

## 2025-08-18 DIAGNOSIS — E21.3 HYPERPARATHYROIDISM: ICD-10-CM

## 2025-08-18 PROCEDURE — 1126F AMNT PAIN NOTED NONE PRSNT: CPT | Performed by: INTERNAL MEDICINE

## 2025-08-18 PROCEDURE — 3079F DIAST BP 80-89 MM HG: CPT | Performed by: INTERNAL MEDICINE

## 2025-08-18 PROCEDURE — 1159F MED LIST DOCD IN RCRD: CPT | Performed by: INTERNAL MEDICINE

## 2025-08-18 PROCEDURE — 99214 OFFICE O/P EST MOD 30 MIN: CPT | Performed by: INTERNAL MEDICINE

## 2025-08-18 PROCEDURE — 3077F SYST BP >= 140 MM HG: CPT | Performed by: INTERNAL MEDICINE

## 2025-08-18 PROCEDURE — 1090F PRES/ABSN URINE INCON ASSESS: CPT | Performed by: INTERNAL MEDICINE

## 2025-08-18 PROCEDURE — G8427 DOCREV CUR MEDS BY ELIG CLIN: HCPCS | Performed by: INTERNAL MEDICINE

## 2025-08-18 PROCEDURE — 1123F ACP DISCUSS/DSCN MKR DOCD: CPT | Performed by: INTERNAL MEDICINE

## 2025-08-18 PROCEDURE — G8399 PT W/DXA RESULTS DOCUMENT: HCPCS | Performed by: INTERNAL MEDICINE

## 2025-08-18 PROCEDURE — 1036F TOBACCO NON-USER: CPT | Performed by: INTERNAL MEDICINE

## 2025-08-18 PROCEDURE — G8419 CALC BMI OUT NRM PARAM NOF/U: HCPCS | Performed by: INTERNAL MEDICINE

## 2025-08-18 PROCEDURE — 3044F HG A1C LEVEL LT 7.0%: CPT | Performed by: INTERNAL MEDICINE

## 2025-08-18 RX ORDER — CINACALCET 30 MG/1
30 TABLET, FILM COATED ORAL DAILY
COMMUNITY